# Patient Record
Sex: FEMALE | Race: WHITE | Employment: FULL TIME | URBAN - METROPOLITAN AREA
[De-identification: names, ages, dates, MRNs, and addresses within clinical notes are randomized per-mention and may not be internally consistent; named-entity substitution may affect disease eponyms.]

---

## 2017-03-31 ENCOUNTER — HOSPITAL ENCOUNTER (OUTPATIENT)
Dept: MAMMOGRAPHY | Age: 62
Discharge: HOME | End: 2017-03-31
Attending: INTERNAL MEDICINE | Admitting: INTERNAL MEDICINE

## 2017-03-31 ENCOUNTER — HOSPITAL ENCOUNTER (OUTPATIENT)
Dept: GENERAL RADIOLOGY | Age: 62
Discharge: HOME | End: 2017-03-31
Attending: DERMATOLOGY | Admitting: DERMATOLOGY

## 2017-03-31 DIAGNOSIS — Z12.31 ENCOUNTER FOR SCREENING MAMMOGRAM FOR BREAST CANCER: ICD-10-CM

## 2017-03-31 LAB — POTASSIUM SERPL-SCNC: 4.6 MMOL/L (ref 3.5–5.1)

## 2017-04-07 ENCOUNTER — HOSPITAL ENCOUNTER (OUTPATIENT)
Dept: MAMMOGRAPHY | Age: 62
Discharge: HOME | End: 2017-04-07
Attending: INTERNAL MEDICINE | Admitting: INTERNAL MEDICINE

## 2017-04-07 DIAGNOSIS — R92.8 ABNORMAL MAMMOGRAM: ICD-10-CM

## 2017-04-07 DIAGNOSIS — R92.8 ABNORMAL MAMMOGRAM OF RIGHT BREAST: ICD-10-CM

## 2017-10-05 ENCOUNTER — HOSPITAL ENCOUNTER (OUTPATIENT)
Dept: MAMMOGRAPHY | Age: 62
Discharge: HOME | End: 2017-10-05
Attending: OBSTETRICS & GYNECOLOGY | Admitting: OBSTETRICS & GYNECOLOGY

## 2017-10-05 DIAGNOSIS — R92.8 ABNORMAL MAMMOGRAM, UNSPECIFIED: ICD-10-CM

## 2018-01-29 ENCOUNTER — OFFICE VISIT (OUTPATIENT)
Dept: ORTHOPEDIC SURGERY | Age: 63
End: 2018-01-29

## 2018-01-29 VITALS
WEIGHT: 143.08 LBS | SYSTOLIC BLOOD PRESSURE: 130 MMHG | DIASTOLIC BLOOD PRESSURE: 80 MMHG | BODY MASS INDEX: 26.33 KG/M2 | HEART RATE: 91 BPM | HEIGHT: 62 IN

## 2018-01-29 DIAGNOSIS — M75.82 ROTATOR CUFF TENDINITIS, LEFT: Primary | ICD-10-CM

## 2018-01-29 DIAGNOSIS — M25.512 LEFT SHOULDER PAIN, UNSPECIFIED CHRONICITY: ICD-10-CM

## 2018-01-29 PROCEDURE — 99203 OFFICE O/P NEW LOW 30 MIN: CPT | Performed by: ORTHOPAEDIC SURGERY

## 2018-01-29 RX ORDER — DICLOFENAC SODIUM 75 MG/1
75 TABLET, DELAYED RELEASE ORAL 2 TIMES DAILY
Qty: 60 TABLET | Refills: 1 | Status: SHIPPED | OUTPATIENT
Start: 2018-01-29 | End: 2018-11-06

## 2018-02-07 ENCOUNTER — HOSPITAL ENCOUNTER (OUTPATIENT)
Dept: PHYSICAL THERAPY | Age: 63
Discharge: HOME | End: 2018-02-28
Admitting: ORTHOPAEDIC SURGERY

## 2018-02-07 NOTE — PLAN OF CARE
restricted    ASSESSMENT:   Functional Impairments   []Noted spinal or UE joint hypomobility   []Noted spinal or UE joint hypermobility   [x]Decreased UE functional ROM   [x]Decreased UE functional strength   []Abnormal reflexes/sensation/myotomal/dermatomal deficits   [x]Decreased RC/scapular/core strength and neuromuscular control   []other:      Functional Activity Limitations (from functional questionnaire and intake)   []Reduced ability to tolerate prolonged functional positions   [x]Reduced ability or difficulty with changes of positions or transfers between positions   []Reduced ability to maintain good posture and demonstrate good body mechanics with sitting, bending, and lifting   [] Reduced ability or tolerance with driving and/or computer work   []Reduced ability to sleep   [x]Reduced ability to perform lifting, reaching, carrying tasks   []Reduced ability to tolerate impact through UE   []Reduced ability to reach behind back   [x]Reduced ability to  or hold objects   []Reduced ability to throw or toss an object   []other:    Participation Restrictions   []Reduced participation in self care activities   [x]Reduced participation in home management activities   []Reduced participation in work activities   []Reduced participation in social activities. [x]Reduced participation in sport/recreation activities. Classification:   []Signs/symptoms consistent with post-surgical status including decreased ROM, strength and function.   []Signs/symptoms consistent with joint sprain/strain   [x]Signs/symptoms consistent with shoulder impingement   []Signs/symptoms consistent with shoulder/elbow/wrist tendinopathy   []Signs/symptoms consistent with Rotator cuff tear   []Signs/symptoms consistent with labral tear   []Signs/symptoms consistent with postural dysfunction    []Signs/symptoms consistent with Glenohumeral IR Deficit - <45 degrees   []Signs/symptoms consistent with facet dysfunction of cervical/thoracic indicated  [x] Patient education on joint protection, postural re-education, activity modification, progression of HEP. HEP instruction: (see scanned forms)    GOALS:  Patient stated goal: able to sleep without pain    Therapist goals for Patient:   Short Term Goals: To be achieved in: 2 weeks  1. Independent in HEP and progression per patient tolerance, in order to prevent re-injury. 2. Patient will have a decrease in pain to facilitate improvement in movement, function, and ADLs as indicated by Functional Deficits. Long Term Goals: To be achieved in: 4 weeks  1. Disability index score of 8% or less for the [facility] to assist with reaching prior level of function. 2. Patient will demonstrate increased AROM to WNL L shoulder to allow for proper joint functioning as indicated by patients Functional Deficits. 3. Patient will demonstrate an increase in Strength to grossly 5/5 L shoulder to allow for proper functional mobility as indicated by patients Functional Deficits. 4. Patient will return to caring for her grandson without increased symptoms or restriction.    5. Pt will be able to sleep through the night without waking due to shoulder pain.  (patient specific functional goal)         Electronically signed by:  Monse Meyers PT

## 2018-02-07 NOTE — FLOWSHEET NOTE
re-education                                                 Therapeutic Exercise and NMR EXR  [x] (42360) Provided verbal/tactile cueing for activities related to strengthening, flexibility, endurance, ROM  for improvements in scapular, scapulothoracic and UE control with self care, reaching, carrying, lifting, house/yardwork, driving/computer work. [x] (51945) Provided verbal/tactile cueing for activities related to improving balance, coordination, kinesthetic sense, posture, motor skill, proprioception  to assist with  scapular, scapulothoracic and UE control with self care, reaching, carrying, lifting, house/yardwork, driving/computer work. Therapeutic Activities:    [] (16165 or 05443) Provided verbal/tactile cueing for activities related to improving balance, coordination, kinesthetic sense, posture, motor skill, proprioception and motor activation to allow for proper function of scapular, scapulothoracic and UE control with self care, carrying, lifting, driving/computer work.      Home Exercise Program:    [x] (58934) Reviewed/Progressed HEP activities related to strengthening, flexibility, endurance, ROM of scapular, scapulothoracic and UE control with self care, reaching, carrying, lifting, house/yardwork, driving/computer work  [] (80564) Reviewed/Progressed HEP activities related to improving balance, coordination, kinesthetic sense, posture, motor skill, proprioception of scapular, scapulothoracic and UE control with self care, reaching, carrying, lifting, house/yardwork, driving/computer work      Manual Treatments:  PROM / STM / Oscillations-Mobs:  G-I, II, III, IV (PA's, Inf., Post.)  [] (60142) Provided manual therapy to mobilize soft tissue/joints of cervical/CT, scapular GHJ and UE for the purpose of modulating pain, promoting relaxation,  increasing ROM, reducing/eliminating soft tissue swelling/inflammation/restriction, improving soft tissue extensibility and allowing for proper ROM for

## 2018-02-19 ENCOUNTER — OFFICE VISIT (OUTPATIENT)
Dept: ORTHOPEDIC SURGERY | Age: 63
End: 2018-02-19

## 2018-02-19 VITALS
BODY MASS INDEX: 26.33 KG/M2 | DIASTOLIC BLOOD PRESSURE: 80 MMHG | WEIGHT: 143.08 LBS | SYSTOLIC BLOOD PRESSURE: 134 MMHG | HEIGHT: 62 IN | HEART RATE: 89 BPM

## 2018-02-19 DIAGNOSIS — M25.512 LEFT SHOULDER PAIN, UNSPECIFIED CHRONICITY: ICD-10-CM

## 2018-02-19 DIAGNOSIS — M75.82 ROTATOR CUFF TENDINITIS, LEFT: Primary | ICD-10-CM

## 2018-02-19 PROCEDURE — 99213 OFFICE O/P EST LOW 20 MIN: CPT | Performed by: ORTHOPAEDIC SURGERY

## 2018-02-19 NOTE — PROGRESS NOTES
Prescriptions:     diclofenac (VOLTAREN) 75 MG EC tablet, Take 1 tablet by mouth 2 times daily, Disp: 60 tablet, Rfl: 1    spironolactone (ALDACTONE) 100 MG tablet, Take 100 mg by mouth daily Takes two tablets per day, Disp: , Rfl:     Cyanocobalamin (VITAMIN B-12) 1000 MCG CR tablet, Take 1,000 mcg by mouth daily, Disp: , Rfl:     Cholecalciferol (VITAMIN D3) 2000 UNITS CAPS, Take 1 capsule by mouth daily, Disp: , Rfl:     calcium carbonate (OSCAL) 500 MG TABS tablet, Take 500 mg by mouth daily, Disp: , Rfl:     Lactobacillus (PROBIOTIC ACIDOPHILUS PO), Take 1 capsule by mouth daily, Disp: , Rfl:     spironolactone (ALDACTONE) 100 MG tablet, Take 1.5 tablets by mouth daily. Takes one and a half daily FOR ACNE (Patient taking differently:  Take 200 mg by mouth daily Takes one and a half daily FOR ACNE), Disp: 45 tablet, Rfl: 0    estradiol (ESTRACE) 1 MG tablet, Take 1 mg by mouth daily. , Disp: , Rfl:     Tretinoin, Facial Wrinkles, (TRETINOIN, EMOLLIENT,) 0.05 % CREA, Apply  topically. Face  Indications: for acne, Disp: , Rfl:   Allergies:  Review of patient's allergies indicates no known allergies. Social History:    reports that she quit smoking about 19 years ago. She has a 1.25 pack-year smoking history. She has never used smokeless tobacco. She reports that she drinks about 4.2 oz of alcohol per week . She reports that she does not use drugs. Family History:   Family History   Problem Relation Age of Onset    Heart Disease Mother     Heart Disease Father        REVIEW OF SYSTEMS:   For new problems, a full review of systems will be found scanned in the patient's chart.   CONSTITUTIONAL: Denies unexplained weight loss, fevers, chills   NEUROLOGICAL: Denies unsteady gait or progressive weakness  SKIN: Denies skin changes, delayed healing, rash, itching       PHYSICAL EXAM:    Vitals: Blood pressure 134/80, pulse 89, height 5' 2.01\" (1.575 m), weight 143 lb 1.3 oz (64.9 kg), not currently breastfeeding. GENERAL EXAM:  · General Apparence: Patient is adequately groomed with no evidence of malnutrition. · Orientation: The patient is oriented to time, place and person. · Mood & Affect:The patient's mood and affect are appropriate       LEFT shoulder PHYSICAL EXAMINATION:  · Inspection:  Upon inspection there is no deformity ecchymosis or erythema    · Palpation:  The patient does not have any tenderness at the a.c. Joint arthritic trapezium      · Range of Motion: dear 165° of forward flexion and abduction. Mild tightness at the extremes of internal and external rotation as well    · Strength: she has mild discomfort but no gross deficits with rotator cuff testing today    · Special Tests:  She can feel touch through the LEFT upper extremity            · Skin:  There are no rashes, ulcerations or lesions. · Gait & station: normal gait      · Additional Examinations:        Right Lower Extremity: Examination of the right lower extremity does not show any tenderness, deformity or injury. Range of motion is unremarkable. There is no gross instability. There are no rashes, ulcerations or lesions. Strength and tone are normal.      Diagnostic Testing:          Orders   No orders of the defined types were placed in this encounter. Assessment / Treatment Plan:     1. LEFT shoulder rotator cuff tendinitis. The patient is fairly content with her improvement thus far. At this time we will continue with the physical therapy and anti-inflammatory. Intact but the possibility of a cortisone injection but the patient symptoms are improving. We will plan to see her back in 4-6 weeks p.r.n.    I have personally performed and/or participated in the history, exam and medical decision making and agree with all pertinent clinical information. I have also reviewed and agree with the past medical, family and social history unless otherwise noted.        This dictation was performed with a verbal recognition program [facility]) and it was checked for errors. It is possible that there are still dictated errors within this office note. If so, please bring any errors to my attention for an addendum. All efforts were made to ensure that this office note is accurate.           Roxana Koenig MD

## 2018-03-01 ENCOUNTER — HOSPITAL ENCOUNTER (OUTPATIENT)
Dept: PHYSICAL THERAPY | Age: 63
Discharge: HOME | End: 2018-03-31
Attending: ORTHOPAEDIC SURGERY | Admitting: ORTHOPAEDIC SURGERY

## 2018-05-04 ENCOUNTER — HOSPITAL ENCOUNTER (OUTPATIENT)
Dept: MAMMOGRAPHY | Age: 63
Discharge: HOME | End: 2018-05-04
Attending: INTERNAL MEDICINE | Admitting: INTERNAL MEDICINE

## 2018-05-04 DIAGNOSIS — Z12.31 VISIT FOR SCREENING MAMMOGRAM: ICD-10-CM

## 2018-08-08 ENCOUNTER — HOSPITAL ENCOUNTER (OUTPATIENT)
Age: 63
Discharge: HOME | End: 2018-08-08
Payer: COMMERCIAL

## 2018-08-08 LAB — POTASSIUM SERPL-SCNC: 4.4 MMOL/L (ref 3.5–5.1)

## 2018-08-08 PROCEDURE — 84132 ASSAY OF SERUM POTASSIUM: CPT

## 2018-08-08 PROCEDURE — 36415 COLL VENOUS BLD VENIPUNCTURE: CPT

## 2018-11-06 ENCOUNTER — OFFICE VISIT (OUTPATIENT)
Dept: INTERNAL MEDICINE CLINIC | Age: 63
End: 2018-11-06
Payer: COMMERCIAL

## 2018-11-06 ENCOUNTER — HOSPITAL ENCOUNTER (OUTPATIENT)
Age: 63
Discharge: HOME | End: 2018-11-06
Payer: COMMERCIAL

## 2018-11-06 VITALS
OXYGEN SATURATION: 99 % | SYSTOLIC BLOOD PRESSURE: 130 MMHG | BODY MASS INDEX: 28.16 KG/M2 | WEIGHT: 154 LBS | DIASTOLIC BLOOD PRESSURE: 78 MMHG | HEART RATE: 98 BPM

## 2018-11-06 DIAGNOSIS — Z00.00 WELL ADULT EXAM: Primary | ICD-10-CM

## 2018-11-06 DIAGNOSIS — Z00.00 WELL ADULT EXAM: ICD-10-CM

## 2018-11-06 LAB
A/G RATIO: 1.6 (ref 1.1–2.2)
ALBUMIN SERPL-MCNC: 4.4 G/DL (ref 3.4–5)
ALP BLD-CCNC: 51 U/L (ref 40–129)
ALT SERPL-CCNC: 14 U/L (ref 10–40)
ANION GAP SERPL CALCULATED.3IONS-SCNC: 12 MMOL/L (ref 3–16)
AST SERPL-CCNC: 16 U/L (ref 15–37)
BILIRUB SERPL-MCNC: 0.4 MG/DL (ref 0–1)
BUN BLDV-MCNC: 13 MG/DL (ref 7–20)
CALCIUM SERPL-MCNC: 9.6 MG/DL (ref 8.3–10.6)
CHLORIDE BLD-SCNC: 104 MMOL/L (ref 99–110)
CHOLESTEROL, TOTAL: 218 MG/DL (ref 0–199)
CO2: 26 MMOL/L (ref 21–32)
CREAT SERPL-MCNC: 0.9 MG/DL (ref 0.6–1.2)
GFR AFRICAN AMERICAN: >60
GFR NON-AFRICAN AMERICAN: >60
GLOBULIN: 2.8 G/DL
GLUCOSE BLD-MCNC: 103 MG/DL (ref 70–99)
HDLC SERPL-MCNC: 102 MG/DL (ref 40–60)
LDL CHOLESTEROL CALCULATED: 103 MG/DL
POTASSIUM SERPL-SCNC: 4.5 MMOL/L (ref 3.5–5.1)
SODIUM BLD-SCNC: 142 MMOL/L (ref 136–145)
TOTAL PROTEIN: 7.2 G/DL (ref 6.4–8.2)
TRIGL SERPL-MCNC: 63 MG/DL (ref 0–150)
VLDLC SERPL CALC-MCNC: 13 MG/DL

## 2018-11-06 PROCEDURE — 99386 PREV VISIT NEW AGE 40-64: CPT | Performed by: INTERNAL MEDICINE

## 2018-11-06 PROCEDURE — 80061 LIPID PANEL: CPT

## 2018-11-06 PROCEDURE — 80053 COMPREHEN METABOLIC PANEL: CPT

## 2018-11-06 PROCEDURE — 36415 COLL VENOUS BLD VENIPUNCTURE: CPT

## 2018-11-06 ASSESSMENT — ENCOUNTER SYMPTOMS
COUGH: 0
CONSTIPATION: 0
NAUSEA: 0
ABDOMINAL DISTENTION: 0
SHORTNESS OF BREATH: 0
DIARRHEA: 0
WHEEZING: 0
BACK PAIN: 0
VOMITING: 0
CHEST TIGHTNESS: 0

## 2018-11-06 ASSESSMENT — PATIENT HEALTH QUESTIONNAIRE - PHQ9
SUM OF ALL RESPONSES TO PHQ9 QUESTIONS 1 & 2: 0
SUM OF ALL RESPONSES TO PHQ QUESTIONS 1-9: 0
2. FEELING DOWN, DEPRESSED OR HOPELESS: 0
1. LITTLE INTEREST OR PLEASURE IN DOING THINGS: 0
SUM OF ALL RESPONSES TO PHQ QUESTIONS 1-9: 0

## 2018-11-06 NOTE — PROGRESS NOTES
11/6/18    Navin Arenas Buchheesther  1955      Chief Complaint   Patient presents with    Annual Exam       HPI  Here to establish care and for a well exam  Past Medical History, Medications, Social History, Family History, Health Maintenance have been reviewed with Tony Alexandra. Review of Systems   Constitutional: Negative for unexpected weight change. Respiratory: Negative for cough, chest tightness, shortness of breath and wheezing. Cardiovascular: Negative for chest pain, palpitations and leg swelling. Gastrointestinal: Negative for abdominal distention, constipation, diarrhea, nausea and vomiting. Musculoskeletal: Negative for arthralgias, back pain and myalgias. Neurological: Negative for tremors and numbness. All other systems reviewed and are negative. Current Outpatient Prescriptions   Medication Sig Dispense Refill    spironolactone (ALDACTONE) 100 MG tablet Take 100 mg by mouth daily Takes two tablets per day      Cyanocobalamin (VITAMIN B-12) 1000 MCG CR tablet Take 1,000 mcg by mouth daily      Cholecalciferol (VITAMIN D3) 2000 UNITS CAPS Take 1 capsule by mouth daily      calcium carbonate (OSCAL) 500 MG TABS tablet Take 500 mg by mouth daily      Lactobacillus (PROBIOTIC ACIDOPHILUS PO) Take 1 capsule by mouth daily      spironolactone (ALDACTONE) 100 MG tablet Take 1.5 tablets by mouth daily. Takes one and a half daily FOR ACNE (Patient taking differently:   Take 200 mg by mouth daily Takes one and a half daily FOR ACNE) 45 tablet 0    estradiol (ESTRACE) 1 MG tablet Take 1 mg by mouth daily.  Tretinoin, Facial Wrinkles, (TRETINOIN, EMOLLIENT,) 0.05 % CREA Apply  topically. Face  Indications: for acne       No current facility-administered medications for this visit. Physical Exam   Constitutional: She is oriented to person, place, and time. She appears well-developed and well-nourished. No distress.    HENT:   Right Ear: External ear normal.   Left Ear: External ear normal.   Nose: Nose normal.   Mouth/Throat: Oropharynx is clear and moist. No oropharyngeal exudate. Neck: Neck supple. No thyromegaly present. Cardiovascular: Normal rate, regular rhythm, normal heart sounds and intact distal pulses. Exam reveals no gallop and no friction rub. No murmur heard. Pulmonary/Chest: Effort normal and breath sounds normal. No respiratory distress. She has no wheezes. She has no rales. She exhibits no tenderness. Abdominal: Soft. She exhibits no distension and no mass. There is no tenderness. There is no rebound and no guarding. No hernia. Musculoskeletal: She exhibits no edema. Neurological: She is alert and oriented to person, place, and time. Skin: She is not diaphoretic. Psychiatric: She has a normal mood and affect. Her behavior is normal. Judgment and thought content normal.   Vitals reviewed. Vitals:    11/06/18 0721   BP: 130/78   Pulse: 98   SpO2: 99%         ASSESSMENT/PLAN:  1. Well adult exam  Encouraged exercise and healthy diet. F/u with ob/gyn and dentist regularly. Recommend eye exam.  Wears seat belt. Provided rx for fasting labs. Continue medications. - Lipid Panel; Future  - Comprehensive Metabolic Panel;  Future

## 2019-05-31 ENCOUNTER — OFFICE VISIT (OUTPATIENT)
Dept: INTERNAL MEDICINE CLINIC | Age: 64
End: 2019-05-31
Payer: COMMERCIAL

## 2019-05-31 VITALS
BODY MASS INDEX: 28.89 KG/M2 | HEIGHT: 62 IN | DIASTOLIC BLOOD PRESSURE: 82 MMHG | OXYGEN SATURATION: 98 % | HEART RATE: 96 BPM | WEIGHT: 157 LBS | SYSTOLIC BLOOD PRESSURE: 172 MMHG

## 2019-05-31 DIAGNOSIS — H60.12 CELLULITIS OF LEFT EAR: Primary | ICD-10-CM

## 2019-05-31 PROCEDURE — 99214 OFFICE O/P EST MOD 30 MIN: CPT | Performed by: NURSE PRACTITIONER

## 2019-05-31 RX ORDER — CEPHALEXIN 500 MG/1
500 CAPSULE ORAL 3 TIMES DAILY
Qty: 21 CAPSULE | Refills: 0 | Status: SHIPPED | OUTPATIENT
Start: 2019-05-31 | End: 2019-06-07

## 2019-05-31 ASSESSMENT — ENCOUNTER SYMPTOMS
RHINORRHEA: 0
VOMITING: 0
SINUS PRESSURE: 0
ABDOMINAL DISTENTION: 0
SHORTNESS OF BREATH: 0
NAUSEA: 0
CHEST TIGHTNESS: 0
DIARRHEA: 0
CONSTIPATION: 0
SINUS PAIN: 0

## 2019-05-31 ASSESSMENT — PATIENT HEALTH QUESTIONNAIRE - PHQ9
2. FEELING DOWN, DEPRESSED OR HOPELESS: 0
1. LITTLE INTEREST OR PLEASURE IN DOING THINGS: 0
SUM OF ALL RESPONSES TO PHQ9 QUESTIONS 1 & 2: 0
SUM OF ALL RESPONSES TO PHQ QUESTIONS 1-9: 0
SUM OF ALL RESPONSES TO PHQ QUESTIONS 1-9: 0

## 2019-05-31 NOTE — PROGRESS NOTES
[address]  [address] [facility]  [address]  Tel:[phone]    Mylene Lucero is a 60 y.o. female who presents today for hermedical conditions/complaints as noted below. Mylene Lucero is c/o of Mass (on head)    Chief Complaint   Patient presents with    Mass     on head     HPI:     Patient presents for evaluation of a possible skin infection. Onset of symptoms was abrupt 5 days ago, with gradually improving symptoms since that time. Symptoms include moderate pain and erythema located left ear with swelling/tenderness on the posterior neck/head. There is not a history of trauma to the area. Treatment to date has included benadryl cream with no relief. Subjective:     Review of Systems   Constitutional: Negative for activity change, fatigue and unexpected weight change. HENT: Negative for congestion, postnasal drip, rhinorrhea, sinus pressure and sinus pain. Cellulitis/rash noted to the tragus of the left ear   Respiratory: Negative for chest tightness and shortness of breath. Cardiovascular: Negative for chest pain, palpitations and leg swelling. Gastrointestinal: Negative for abdominal distention, constipation, diarrhea, nausea and vomiting. Genitourinary: Negative for difficulty urinating and urgency. Musculoskeletal: Positive for neck pain and neck stiffness. Skin: Negative for rash. Cellulitis/rash noted to the tragus of the left ear   Neurological: Negative for dizziness, weakness and light-headedness. Objective:     Vitals:    05/31/19 1408   BP: (!) 172/82   Site: Right Upper Arm   Position: Sitting   Cuff Size: Medium Adult   Pulse: 96   SpO2: 98%   Weight: 157 lb (71.2 kg)   Height: 5' 2\" (1.575 m)       Physical Exam   Constitutional: Vital signs are normal. She appears well-developed and well-nourished. HENT:   Head: Normocephalic and atraumatic.    Right Ear: Hearing, tympanic membrane, external ear and ear canal normal.   Left Ear: Hearing, tympanic membrane and ear canal normal.   Ears:    Nose: Nose normal.   Eyes: Pupils are equal, round, and reactive to light. Lids are normal.   Neck: Normal range of motion. Muscular tenderness present. Cardiovascular: Normal rate, regular rhythm, S1 normal, S2 normal, normal heart sounds and normal pulses. No extrasystoles are present. PMI is not displaced. Exam reveals no gallop, no S3, no S4, no distant heart sounds and no friction rub. No murmur heard. No systolic murmur is present. No diastolic murmur is present. Pulmonary/Chest: Effort normal and breath sounds normal. No accessory muscle usage. No respiratory distress. She has no decreased breath sounds. She has no wheezes. She has no rhonchi. She has no rales. Abdominal: Soft. Normal appearance and bowel sounds are normal.   Lymphadenopathy:        Head (right side): No submandibular, no tonsillar, no preauricular and no posterior auricular adenopathy present. Head (left side): Posterior auricular adenopathy present. No submandibular, no tonsillar and no preauricular adenopathy present. She has cervical adenopathy. Left cervical: Posterior cervical adenopathy present. Neurological: She is alert. Skin: Skin is warm, dry and intact. Psychiatric: She has a normal mood and affect. Her speech is normal.   Nursing note and vitals reviewed. Assessment & Plan: The following diagnoses and conditions are stable with no further orders unlessindicated:    1. Cellulitis of left ear      Reji Pichardo was seen today for mass. Diagnoses and all orders for this visit:    Cellulitis of left ear  -     cephALEXin (KEFLEX) 500 MG capsule; Take 1 capsule by mouth 3 times daily for 7 days    Begin antibiotic for cellulitis of left ear with resultant reactive lymphadenopathy to the posterior cervical chain and anterior nodes.  Can trial ibuprofen for relief of inflammation for the neck

## 2019-06-14 ENCOUNTER — HOSPITAL ENCOUNTER (OUTPATIENT)
Dept: WOMENS IMAGING | Age: 64
Discharge: HOME | End: 2019-06-14
Payer: COMMERCIAL

## 2019-06-14 DIAGNOSIS — Z12.31 VISIT FOR SCREENING MAMMOGRAM: ICD-10-CM

## 2019-06-14 PROCEDURE — 77067 SCR MAMMO BI INCL CAD: CPT

## 2019-06-19 ENCOUNTER — TELEPHONE (OUTPATIENT)
Dept: WOMENS IMAGING | Age: 64
End: 2019-06-19

## 2019-07-03 ENCOUNTER — HOSPITAL ENCOUNTER (OUTPATIENT)
Dept: WOMENS IMAGING | Age: 64
Discharge: HOME | End: 2019-07-03
Payer: COMMERCIAL

## 2019-07-03 DIAGNOSIS — R92.8 ABNORMAL MAMMOGRAM: ICD-10-CM

## 2019-07-03 PROCEDURE — 77065 DX MAMMO INCL CAD UNI: CPT

## 2019-08-27 ENCOUNTER — TELEPHONE (OUTPATIENT)
Dept: INTERNAL MEDICINE CLINIC | Age: 64
End: 2019-08-27

## 2019-08-28 ENCOUNTER — TELEPHONE (OUTPATIENT)
Dept: INTERNAL MEDICINE CLINIC | Age: 64
End: 2019-08-28

## 2019-08-28 ENCOUNTER — HOSPITAL ENCOUNTER (OUTPATIENT)
Age: 64
Discharge: HOME | End: 2019-08-28
Payer: COMMERCIAL

## 2019-08-28 ENCOUNTER — NURSE ONLY (OUTPATIENT)
Dept: INTERNAL MEDICINE CLINIC | Age: 64
End: 2019-08-28
Payer: COMMERCIAL

## 2019-08-28 DIAGNOSIS — Z00.00 WELL ADULT EXAM: Primary | ICD-10-CM

## 2019-08-28 DIAGNOSIS — Z00.00 WELL ADULT EXAM: ICD-10-CM

## 2019-08-28 DIAGNOSIS — Z23 NEED FOR SHINGLES VACCINE: Primary | ICD-10-CM

## 2019-08-28 LAB — POTASSIUM SERPL-SCNC: 4.3 MMOL/L (ref 3.5–5.1)

## 2019-08-28 PROCEDURE — 84132 ASSAY OF SERUM POTASSIUM: CPT

## 2019-08-28 PROCEDURE — 36415 COLL VENOUS BLD VENIPUNCTURE: CPT

## 2019-08-28 PROCEDURE — 90471 IMMUNIZATION ADMIN: CPT | Performed by: INTERNAL MEDICINE

## 2019-08-28 PROCEDURE — 90750 HZV VACC RECOMBINANT IM: CPT | Performed by: INTERNAL MEDICINE

## 2019-10-28 ENCOUNTER — TELEPHONE (OUTPATIENT)
Dept: INTERNAL MEDICINE CLINIC | Age: 64
End: 2019-10-28

## 2019-10-28 DIAGNOSIS — Z00.00 ROUTINE GENERAL MEDICAL EXAMINATION AT A HEALTH CARE FACILITY: Primary | ICD-10-CM

## 2019-11-01 ENCOUNTER — NURSE ONLY (OUTPATIENT)
Dept: INTERNAL MEDICINE CLINIC | Age: 64
End: 2019-11-01
Payer: COMMERCIAL

## 2019-11-01 DIAGNOSIS — Z23 NEED FOR SHINGLES VACCINE: ICD-10-CM

## 2019-11-01 DIAGNOSIS — Z23 NEED FOR IMMUNIZATION AGAINST INFLUENZA: Primary | ICD-10-CM

## 2019-11-01 PROCEDURE — 90471 IMMUNIZATION ADMIN: CPT | Performed by: NURSE PRACTITIONER

## 2019-11-01 PROCEDURE — 90750 HZV VACC RECOMBINANT IM: CPT | Performed by: NURSE PRACTITIONER

## 2019-11-02 ENCOUNTER — HOSPITAL ENCOUNTER (OUTPATIENT)
Age: 64
Discharge: HOME | End: 2019-11-02
Payer: COMMERCIAL

## 2019-11-02 DIAGNOSIS — Z00.00 ROUTINE GENERAL MEDICAL EXAMINATION AT A HEALTH CARE FACILITY: ICD-10-CM

## 2019-11-02 LAB
A/G RATIO: 2 (ref 1.1–2.2)
ALBUMIN SERPL-MCNC: 5.3 G/DL (ref 3.4–5)
ALP BLD-CCNC: 68 U/L (ref 40–129)
ALT SERPL-CCNC: 16 U/L (ref 10–40)
ANION GAP SERPL CALCULATED.3IONS-SCNC: 17 MMOL/L (ref 3–16)
AST SERPL-CCNC: 16 U/L (ref 15–37)
BILIRUB SERPL-MCNC: 1 MG/DL (ref 0–1)
BUN BLDV-MCNC: 11 MG/DL (ref 7–20)
CALCIUM SERPL-MCNC: 9.5 MG/DL (ref 8.3–10.6)
CHLORIDE BLD-SCNC: 99 MMOL/L (ref 99–110)
CHOLESTEROL, TOTAL: 233 MG/DL (ref 0–199)
CO2: 24 MMOL/L (ref 21–32)
CREAT SERPL-MCNC: 0.9 MG/DL (ref 0.6–1.2)
GFR AFRICAN AMERICAN: >60
GFR NON-AFRICAN AMERICAN: >60
GLOBULIN: 2.6 G/DL
GLUCOSE BLD-MCNC: 103 MG/DL (ref 70–99)
HDLC SERPL-MCNC: 103 MG/DL (ref 40–60)
LDL CHOLESTEROL CALCULATED: 102 MG/DL
POTASSIUM SERPL-SCNC: 4.1 MMOL/L (ref 3.5–5.1)
SODIUM BLD-SCNC: 140 MMOL/L (ref 136–145)
TOTAL PROTEIN: 7.9 G/DL (ref 6.4–8.2)
TRIGL SERPL-MCNC: 142 MG/DL (ref 0–150)
VLDLC SERPL CALC-MCNC: 28 MG/DL

## 2019-11-02 PROCEDURE — 80061 LIPID PANEL: CPT

## 2019-11-02 PROCEDURE — 80053 COMPREHEN METABOLIC PANEL: CPT

## 2019-11-02 PROCEDURE — 36415 COLL VENOUS BLD VENIPUNCTURE: CPT

## 2019-11-11 ENCOUNTER — OFFICE VISIT (OUTPATIENT)
Dept: INTERNAL MEDICINE CLINIC | Age: 64
End: 2019-11-11
Payer: COMMERCIAL

## 2019-11-11 VITALS
DIASTOLIC BLOOD PRESSURE: 88 MMHG | BODY MASS INDEX: 28.53 KG/M2 | WEIGHT: 156 LBS | SYSTOLIC BLOOD PRESSURE: 138 MMHG | HEART RATE: 98 BPM | OXYGEN SATURATION: 98 %

## 2019-11-11 DIAGNOSIS — Z00.00 WELL ADULT EXAM: Primary | ICD-10-CM

## 2019-11-11 PROCEDURE — 99396 PREV VISIT EST AGE 40-64: CPT | Performed by: INTERNAL MEDICINE

## 2020-03-30 NOTE — PROGRESS NOTES
PHYSICAL EXAMINATION:  · Inspection:  No visible asymmetry or deformity. · Palpation:  Tenderness in the subacromial region to palpation. No  · Asthma no neck tenderness. No pain past the deltoid. · Range of Motion: Slightly limited particularly in abduction external rotation and abduction internal rotation. · Strength: No focal motor weakness. · Special Tests:  Positive supraspinous sign LEFT. Minimally positive speed's test.  Negative crossarm test.  Negative apprehension sign. · Skin:  There are no rashes, ulcerations or lesions. · Gait & station:       · Additional Examinations:        Right Upper Extremity:  Examination of the right upper extremity does not show any tenderness, deformity or injury. Range of motion is unremarkable. There is no gross instability. There are no rashes, ulcerations or lesions. Strength and tone are normal.      Diagnostic Testing:      3 x-ray views of the LEFT shoulder demonstrates    Orders     Orders Placed This Encounter   Procedures    XR SHOULDER LEFT (MIN 2 VIEWS)     [id number]     Order Specific Question:   Reason for exam:     Answer:   Pain         Assessment / Treatment Plan:     1. LEFT rotator cuff tendinitis. We discussed treatment options. She is going to try a course of Voltaren 75 mg b.i.d. and physical therapy. Follow-up will be in 3-4 weeks p.r.n.    2. I have personally performed and/or participated in the history, exam and medical decision making and agree with all pertinent clinical information. I have also reviewed and agree with the past medical, family and social history unless otherwise noted. This dictation was performed with a verbal recognition program ([employer]) and it was checked for errors. It is possible that there are still dictated errors within this office note. If so, please bring any errors to my attention for an addendum. All efforts were made to ensure that this office note is accurate.           Bruce Lowry Facundo Olivier MD

## 2020-08-10 ENCOUNTER — OFFICE VISIT (OUTPATIENT)
Dept: PRIMARY CARE CLINIC | Age: 65
End: 2020-08-10
Payer: COMMERCIAL

## 2020-08-10 PROCEDURE — 99211 OFF/OP EST MAY X REQ PHY/QHP: CPT | Performed by: NURSE PRACTITIONER

## 2020-08-10 NOTE — PROGRESS NOTES
Stacy Everett received a viral test for COVID-19. They were educated on isolation and quarantine as appropriate. For any symptoms, they were directed to seek care from their PCP, given contact information to establish with a doctor, directed to an urgent care or the emergency room.

## 2020-08-10 NOTE — PATIENT INSTRUCTIONS

## 2020-08-14 LAB
SARS-COV-2: NOT DETECTED
SOURCE: NORMAL

## 2020-08-17 ENCOUNTER — OFFICE VISIT (OUTPATIENT)
Dept: PRIMARY CARE CLINIC | Age: 65
End: 2020-08-17
Payer: COMMERCIAL

## 2020-08-17 PROCEDURE — 99211 OFF/OP EST MAY X REQ PHY/QHP: CPT | Performed by: NURSE PRACTITIONER

## 2020-08-17 NOTE — PATIENT INSTRUCTIONS
Advance Care Planning  People with COVID-19 may have no symptoms, mild symptoms, such as fever, cough, and shortness of breath or they may have more severe illness, developing severe and fatal pneumonia. As a result, Advance Care Planning with attention to naming a health care decision maker (someone you trust to make healthcare decisions for you if you could not speak for yourself) and sharing other health care preferences is important BEFORE a possible health crisis. Please contact your Primary Care Provider to discuss Advance Care Planning. Preventing the Spread of Coronavirus Disease 2019 in Homes and Residential Communities  For the most recent information go to [email or web address]    Prevention steps for People with confirmed or suspected COVID-19 (including persons under investigation) who do not need to be hospitalized  and   People with confirmed COVID-19 who were hospitalized and determined to be medically stable to go home    Your healthcare provider and public health staff will evaluate whether you can be cared for at home. If it is determined that you do not need to be hospitalized and can be isolated at home, you will be monitored by staff from your local or state health department. You should follow the prevention steps below until a healthcare provider or local or state health department says you can return to your normal activities. Stay home except to get medical care  People who are mildly ill with COVID-19 are able to isolate at home during their illness. You should restrict activities outside your home, except for getting medical care. Do not go to work, school, or public areas. Avoid using public transportation, ride-sharing, or taxis. Separate yourself from other people and animals in your home  People: As much as possible, you should stay in a specific room and away from other people in your home.  Also, you should use a separate before eating or preparing food. If soap and water are not readily available, use an alcohol-based hand  with at least 60% alcohol, covering all surfaces of your hands and rubbing them together until they feel dry. Soap and water are the best option if hands are visibly dirty. Avoid touching your eyes, nose, and mouth with unwashed hands. Avoid sharing personal household items  You should not share dishes, drinking glasses, cups, eating utensils, towels, or bedding with other people or pets in your home. After using these items, they should be washed thoroughly with soap and water. Clean all high-touch surfaces everyday  High touch surfaces include counters, tabletops, doorknobs, bathroom fixtures, toilets, phones, keyboards, tablets, and bedside tables. Also, clean any surfaces that may have blood, stool, or body fluids on them. Use a household cleaning spray or wipe, according to the label instructions. Labels contain instructions for safe and effective use of the cleaning product including precautions you should take when applying the product, such as wearing gloves and making sure you have good ventilation during use of the product. Monitor your symptoms  Seek prompt medical attention if your illness is worsening (e.g., difficulty breathing). Before seeking care, call your healthcare provider and tell them that you have, or are being evaluated for, COVID-19. Put on a facemask before you enter the facility. These steps will help the healthcare providers office to keep other people in the office or waiting room from getting infected or exposed. Ask your healthcare provider to call the local or state health department. Persons who are placed under active monitoring or facilitated self-monitoring should follow instructions provided by their local health department or occupational health professionals, as appropriate. When working with your local health department check their available hours.   If you

## 2020-08-18 LAB — SARS-COV-2, NAA: NOT DETECTED

## 2020-09-15 ENCOUNTER — OFFICE VISIT (OUTPATIENT)
Dept: INTERNAL MEDICINE CLINIC | Age: 65
End: 2020-09-15
Payer: COMMERCIAL

## 2020-09-15 ENCOUNTER — HOSPITAL ENCOUNTER (OUTPATIENT)
Dept: GENERAL RADIOLOGY | Age: 65
Discharge: HOME | End: 2020-09-15
Payer: COMMERCIAL

## 2020-09-15 ENCOUNTER — HOSPITAL ENCOUNTER (OUTPATIENT)
Age: 65
Discharge: HOME | End: 2020-09-15
Payer: COMMERCIAL

## 2020-09-15 VITALS
BODY MASS INDEX: 28.72 KG/M2 | WEIGHT: 157 LBS | TEMPERATURE: 97.7 F | SYSTOLIC BLOOD PRESSURE: 135 MMHG | DIASTOLIC BLOOD PRESSURE: 75 MMHG | HEART RATE: 121 BPM | OXYGEN SATURATION: 97 %

## 2020-09-15 PROCEDURE — 73030 X-RAY EXAM OF SHOULDER: CPT

## 2020-09-15 PROCEDURE — 99214 OFFICE O/P EST MOD 30 MIN: CPT | Performed by: INTERNAL MEDICINE

## 2020-09-15 RX ORDER — VALACYCLOVIR HYDROCHLORIDE 1 G/1
2000 TABLET, FILM COATED ORAL 2 TIMES DAILY
Qty: 8 TABLET | Refills: 0 | Status: SHIPPED | OUTPATIENT
Start: 2020-09-15 | End: 2021-11-11 | Stop reason: SDUPTHER

## 2020-09-15 ASSESSMENT — ENCOUNTER SYMPTOMS
NAUSEA: 0
COUGH: 0
SHORTNESS OF BREATH: 0
ABDOMINAL DISTENTION: 0
WHEEZING: 0
ROS SKIN COMMENTS: COLD SORES
CONSTIPATION: 0
VOMITING: 0
BACK PAIN: 0
DIARRHEA: 0
CHEST TIGHTNESS: 0

## 2020-09-15 ASSESSMENT — PATIENT HEALTH QUESTIONNAIRE - PHQ9
SUM OF ALL RESPONSES TO PHQ QUESTIONS 1-9: 0
SUM OF ALL RESPONSES TO PHQ QUESTIONS 1-9: 0
SUM OF ALL RESPONSES TO PHQ9 QUESTIONS 1 & 2: 0
2. FEELING DOWN, DEPRESSED OR HOPELESS: 0
1. LITTLE INTEREST OR PLEASURE IN DOING THINGS: 0

## 2020-09-15 NOTE — PROGRESS NOTES
9/15/20    [facility]  1955      Chief Complaint   Patient presents with    Shoulder Pain       HPI    Here with c/o 3 day h/o left shoulder pain. Tripped over her grandchild landing on her left shoulder. Has had increased pain and restricted rom due to discomfort. Pain is constant and does not radiate. No bruising, numbness, weakness. Gets some relief with ibuprofen 400mg and Aspercreme. Pt also c/o intermittent episodes of cold sores in her nose and lips. Does not take anything for her sxs. Sxs present for several years. Occurs one to two times a year    Review of Systems   Constitutional: Negative for unexpected weight change. Respiratory: Negative for cough, chest tightness, shortness of breath and wheezing. Cardiovascular: Negative for chest pain, palpitations and leg swelling. Gastrointestinal: Negative for abdominal distention, constipation, diarrhea, nausea and vomiting. Musculoskeletal: Positive for arthralgias. Negative for back pain and myalgias. Skin:        Cold sores   Neurological: Negative for tremors and numbness. All other systems reviewed and are negative. Current Outpatient Medications   Medication Sig Dispense Refill    valACYclovir (VALTREX) 1 g tablet Take 2 tablets by mouth 2 times daily for 1 day 8 tablet 0    Cyanocobalamin (VITAMIN B-12) 1000 MCG CR tablet Take 1,000 mcg by mouth daily      Cholecalciferol (VITAMIN D3) 2000 UNITS CAPS Take 1 capsule by mouth daily      calcium carbonate (OSCAL) 500 MG TABS tablet Take 500 mg by mouth daily      Lactobacillus (PROBIOTIC ACIDOPHILUS PO) Take 1 capsule by mouth daily      spironolactone (ALDACTONE) 100 MG tablet Take 1.5 tablets by mouth daily. Takes one and a half daily FOR ACNE (Patient taking differently:   Take 200 mg by mouth daily Takes one and a half daily FOR ACNE) 45 tablet 0    Tretinoin, Facial Wrinkles, (TRETINOIN, EMOLLIENT,) 0.05 % CREA Apply  topically.  Face  Indications: for acne No current facility-administered medications for this visit. Physical Exam  Constitutional:       General: She is not in acute distress. Appearance: She is well-developed. She is not diaphoretic. HENT:      Right Ear: External ear normal.      Left Ear: External ear normal.      Mouth/Throat:      Pharynx: No oropharyngeal exudate. Neck:      Musculoskeletal: Neck supple. Thyroid: No thyromegaly. Cardiovascular:      Rate and Rhythm: Normal rate and regular rhythm. Heart sounds: Normal heart sounds. No murmur. No friction rub. No gallop. Pulmonary:      Effort: Pulmonary effort is normal. No respiratory distress. Breath sounds: Normal breath sounds. No wheezing or rales. Abdominal:      General: There is no distension. Palpations: Abdomen is soft. Tenderness: There is no abdominal tenderness. There is no guarding or rebound. Musculoskeletal:      Comments: Left shoulder with no deformity, palpable tenderness. Active and and passive rom decreased with shoulder abduction, flex/ext due to discomfort. Left UE neurovascularly intact   Skin:     General: Skin is warm and dry. Findings: No rash. Neurological:      Mental Status: She is alert and oriented to person, place, and time. Vitals:    09/15/20 1449   BP: 135/75   Pulse:    Temp:    SpO2:          ASSESSMENT/PLAN:  1. Acute pain of left shoulder  Recommend ibuprofen 600mg tid with food for 7 days. Consider PT if sxs persist  - XR SHOULDER LEFT (MIN 2 VIEWS); Future    2. H/O cold sores  tx optiions discussed with pt. Discussed use, benefit, and side effects of prescribed medications. Barriers to compliance discussed. All patient questions answered. Pt voiced understanding. Call if no improvement or worsening symptoms  - valACYclovir (VALTREX) 1 g tablet; Take 2 tablets by mouth 2 times daily for 1 day  Dispense: 8 tablet;  Refill: 0

## 2020-09-16 ENCOUNTER — CLINICAL DOCUMENTATION (OUTPATIENT)
Dept: INTERNAL MEDICINE CLINIC | Age: 65
End: 2020-09-16

## 2020-10-05 ENCOUNTER — TELEPHONE (OUTPATIENT)
Dept: INTERNAL MEDICINE CLINIC | Age: 65
End: 2020-10-05

## 2020-10-05 NOTE — TELEPHONE ENCOUNTER
Spoke with patient she would like to start some PT and also get the MRI done. She has used [facility]ena in the past but it was pricey. She would like to be referred to [facility] PT if possible. Please order MRI and PT and I will call patient with contact information.

## 2020-10-05 NOTE — TELEPHONE ENCOUNTER
Dr Josh Martin recommended PT in her OV note. With reviewing OV note from Dr Roxann Somers, I see there was an injury. If no improvement, recommend MRI and PT. Then if no resolution, we can refer to Ortho. Let me know if patient would like to follow through on this.

## 2020-10-05 NOTE — TELEPHONE ENCOUNTER
----- Message from Kristie Guillaume sent at 10/2/2020  3:07 PM EDT -----  Subject: Message to Provider    QUESTIONS  Information for Provider? Pt wants to know what she needs to do to start   physical therapy she's been having some shoulder pain. Wondering if she   needs to see and orthopedic Dr first or if you can refer her.  ---------------------------------------------------------------------------  --------------  [address]  What is the best way for the office to contact you? OK to leave message on   voicemail  Preferred Call Back Phone Number? [phone]  ---------------------------------------------------------------------------  --------------  SCRIPT ANSWERS  Relationship to Patient?  Self

## 2020-10-12 ENCOUNTER — APPOINTMENT (OUTPATIENT)
Dept: PHYSICAL THERAPY | Age: 65
End: 2020-10-12
Payer: COMMERCIAL

## 2020-10-14 ENCOUNTER — TELEPHONE (OUTPATIENT)
Dept: INTERNAL MEDICINE CLINIC | Age: 65
End: 2020-10-14

## 2020-10-14 ENCOUNTER — HOSPITAL ENCOUNTER (OUTPATIENT)
Dept: PHYSICAL THERAPY | Age: 65
Setting detail: THERAPIES SERIES
Discharge: HOME | End: 2020-10-14
Payer: COMMERCIAL

## 2020-10-14 PROCEDURE — 97530 THERAPEUTIC ACTIVITIES: CPT

## 2020-10-14 PROCEDURE — 97161 PT EVAL LOW COMPLEX 20 MIN: CPT

## 2020-10-14 NOTE — PROGRESS NOTES
Physical Therapy  Initial Assessment  Date: 10/14/2020  Patient Name: Davon Cordova  MRN: [id number]  : 1955     Treatment Diagnosis: Arm Pain    Subjective   General  Chart Reviewed: Yes  Patient assessed for rehabilitation services?: Yes  Family / Caregiver Present: No  Referring Practitioner: WALLY Santiago CNP  Diagnosis: M25.512 (ICD-10-CM) - Acute pain of left shoulder, W19. Rodger Lozano (ICD-10-CM) - Fall, subsequent encounter  PT Visit Information  Onset Date: 10/05/20  PT Insurance Information: [facility]  Total # of Visits Approved: 12  Subjective  Subjective: Pt stated had some therapy for her L shoudler back in 2018. Pt is coming for the same issue from a different injury. Pt said that she tripped over her one year old and fell backwars. Pt fell on her elblow and hurt her shoulder. Pt had pain immdiatly after the fall. Pt said this happened on a Saturday and kay to the MD on Tuesday. Pt had an x-ray. Pt was told she had no broken bones. MD told her to give her 2 weeks and if she had pain/limited ROM to try some PT. Pt fall was on . Pt said her symptoms have been improving. Pt said sitting does not hurt her at all. Standing does not bother it. Pt says she has pain any time she is laying. Pt has pain in the top of her shoulder. upper arm, and down in to the lower arm. Pt has no numbness and tingling in the L arm. Pt said she has some neck issues that she has been dealing with before the fall. Has not noticed anything new as far as her neck. Pt has difficulty reaching up, pushing, and reaching back. No nausea, vision changes, or dizziness. Pt works as a . Pt can perform all of her job related tasks without pain besides for lifting. Pt has pain getting dressed and showering due to pain in her L arm. Pt said she enjoys playing golf, walking, softball. Pt said her pain gets up to a 4-5 at night when laying. Pt said taking advil an icing the shoulder helps with the pain.  Pt said she is a side sleeper. Pt said she wakes up multiple times a night due to her shoulder. Pt is R handed. Pt also has difficulty lifting grandbabies (5 y/o, 2 y/o, 3 months, and 2 weeks       Orientation  Orientation  Overall Orientation Status: Within Normal Limits    Objective     Observation/Palpation  Posture: Fair  Palpation: Pt had mild TTP over biceps in bicipital groove, insertion of rotator cuff and over UT. R shoulder depressed compared to L. mild scapular winging, upward rotation, and anterior tilt  Observation: Bilateral AGMR of UE.  Rounded shoulders, FHP  Body Mechanics: Bilateral AGMR of UE. shoulder elevation when performing flexion and abduction of LUE. rounded shoulders, FHP    PROM RLE (degrees)  RLE PROM: WNL  AROM RLE (degrees)  RLE AROM: WNL  PROM LLE (degrees)  LLE General PROM: Flexion: 120 degrees, Abduction 48 degrees, IR: 60 degrees, ER: 60 degrees  Spine  Cervical: Flexion 70 degrees, Extension 50 degrees, L side bendin degrees, R side bendin degrees, L rotation: 65 degrees, R rotation: 40 degrees  Special Tests: Sharp pursors (-), compression (-), distraction (-), alar lig test (-)  Joint Mobility  Spine: Cervical hypomobility C0-C1, C1-C2, and C3-C5 L>R    Strength RUE  Strength RUE: WFL  Strength LUE  Strength LUE: Exception  L Shoulder Flexion: 3+/5  L Shoulder ABduction: 3/5  L Shoulder Internal Rotation: 3+/5  L Shoulder External Rotation: 3+/5  L Elbow Flexion: 4+/5  L Elbow Extension: 4+/5  L Wrist Flexion: 4+/5  L Wrist Extension: 4+/5  Strength Other  Other: Serratus (3+)     Additional Measures  Special Tests: Drop arm (-), empty can (+), Speeds Test (-), yergason's test (-), Gutiérrez-johnathan (+), Chandelier Test (+)  Other: DTR: 2+ Normal Bilaterally, Clonus (-), Hoffmans (-)  Sensation  Overall Sensation Status: WNL    Assessment   Conditions Requiring Skilled Therapeutic Intervention  Body structures, Functions, Activity limitations: Increased pain;Decreased posture;Decreased balance;Decreased ADL status; Decreased ROM; Decreased strength;Decreased high-level IADLs  Assessment: Pt is a 64 y/o female that presents to [facility] after pt tripped over her grandchild and fell on her L shoudler resulting in increased pain. Pt presents with multiple decefits affecting pt`s quality of life including decreased cervical ROM, decreased L shoulder ROM, impaired LUE strength, cervical and LGHJ hypomobility, and impaired posture resulting in AGMR of BUE. Pt had postiive tests for hernandez-johnathan and empty can test indicating rotator cuff involvment and impingement. Pt would benefit from skilled physical therapy in order to improve impairments and progress toward goals to maximize function  Treatment Diagnosis: Arm Pain  Prognosis: Good  Decision Making: Low Complexity  REQUIRES PT FOLLOW UP: Yes         Plan   Plan  Times per week: 2x/week for 6 weeks  Plan weeks: 6 weeks  Current Treatment Recommendations: Strengthening, Neuromuscular Re-education, Home Exercise Program, ROM, Manual Therapy - Soft Tissue Mobilization, Balance Training, Endurance Training, Manual Therapy - Joint Manipulation, Modalities, Pain Management, Stair training    G-Code   Quick Dash Sum Score: 33/55   Quick Dash Calculated Score: 50%    Goals  Long term goals  Time Frame for Long term goals : 6 weeks  Long term goal 1: Pt will be independent and compliant with HEP  Long term goal 2: Pt will improve LUE strength to 4/5 gross strength  Long term goal 3: Pt will improve LUE ROM to WNL  Long term goal 4: Pt will report at least 75% improvement in frequency and duration of pain  Long term goal 5: Pt will report being able to sleep through the night without waking up secondary to L shoulder pain. Long term goal 5: Pt will improve QuickDash Score by at least 10 points to demonstrate improvement in function.      Therapy Time   Individual Concurrent Group Co-treatment   Time In 8128 Time Out 0815         Minutes 42         Timed Code Treatment Minutes: 69 Tyner Momo Ambrosio PT      Outpatient Physical Therapy  Phone: [phone]            Fax: [phone]          To: WALLY Santiago CNP                                  From: Laura Tomlin PT    Patient: Davon Cordova                                         : 1955  Diagnosis: M25.512 (ICD-10-CM) - Acute pain of left shoulder, W19. XXXD (ICD-10-CM) - Fall, subsequent encounter                                       Date: 10/14/2020  Treatment Diagnosis:  Shoulder      Physical Therapy Certification/Re-Certification Form  Dear WALLY Pizarro CNP  The following patient has been evaluated for physical therapy services and for therapy to continue, Medicare requires monthly physician review of the treatment plan. Please review the attached evaluation and/or summary of the patient's plan of care, and verify that you agree therapy should continue by signing the attached document and sending it back to our office.     Plan of Care/Treatment to date:  [x]? Therapeutic Exercise                     [x]? Modalities:  [x]? Therapeutic Activity                                   []? Ultrasound              []? Electrical Stimulation       []? Gait Training                                              []? Cervical Traction    []? Lumbar Traction  [x]? Neuromuscular Re-education                    []? Hot/Coldpack         []? Iontophoresis                       [x]? Instruction in HEP                                      Other:  [x]? Manual Therapy                                                     []?                        []? Aquatic Therapy                                                     []?                      ?                         Frequency/Duration:  # Days per week:       []? 1 day          # Weeks:        []? 1 week        []? 5 weeks                                            [x]? 2 days? []? 2 weeks      [x]? 6 weeks                                      []? 3 days                                 []? 3 weeks      []? 7 weeks                                      []? 4 days                                 []? 4 weeks      []? 8 weeks     Rehab Potential:        []? Excellent     [x]? Good          []? Fair             []? Poor                    Electronically signed by:  Sherry Dan PT        If you have any questions or concerns, please don't hesitate to call.   Thank you for your referral.     Physician Signature:________________________________Date:__________________  By signing above, therapists plan is approved by physician

## 2020-10-14 NOTE — TELEPHONE ENCOUNTER
----- Message from Ninfa Freitas sent at 10/14/2020  2:03 PM EDT -----  Subject: Message to Provider    QUESTIONS  Information for Provider? patient would like the potassium results sent   over to her Dermatologist Fabian Culp [phone]  ---------------------------------------------------------------------------  --------------  [address]  What is the best way for the office to contact you? OK to leave message on   voicemail  Preferred Call Back Phone Number? [phone]  ---------------------------------------------------------------------------  --------------  SCRIPT ANSWERS  Relationship to Patient?  Self

## 2020-10-14 NOTE — FLOWSHEET NOTE
Physical Therapy Daily Treatment Note    Date:  10/14/2020    Patient Name:  Jeffrey Basilio    :  1955  MRN: [id number]  Restrictions/Precautions:    Medical/Treatment Diagnosis Information:  · Diagnosis: M25.512 (ICD-10-CM) - Acute pain of left shoulder, W19. Tamera Burleson (ICD-10-CM) - Fall, subsequent encounter  Insurance/Certification information:  PT Insurance Information: [facility]  Physician Information:  Referring Practitioner: WALLY Klein CNP  Plan of care signed (Y/N):  N  Visit# / total visits:       G-Code (if applicable): Quick Dash Calculated Score 33/55    Quick Dash Sum Score: 50%      Time in:   7:33     Timed Treatment: 42 Total Treatment Time:  15    ________________________________________________________________________________________    Pain Level:    /10  SUBJECTIVE:  See Initial Evaluation     OBJECTIVE:     Exercise/Equipment Resistance/Repetitions Other comments     Cane press nv      sidelying ER   Sidelying abduction nv  nv      No $ nv      Scapular retraction  nv                                                                                                                  Other Therapeutic Activities:  Pt was educated on diagnosis, involved anatomy, POC, HEP, established goals, and facility policies.      Manual Treatments:       GHJ mobs grade 2-3 nv  Cervical mobs nv  First rib nv  Gentle PROM    Modalities:      Test/Measurements:  See Initial Evaluation        ASSESSMENT:  See Initial Evaluation     Treatment/Activity Tolerance:   [x]Patient tolerated treatment well [] Patient limited by fatique  [x]Patient limited by pain [] Patient limited by other medical complications  [] Other:     Goals:          Long term goals  Time Frame for Long term goals : 6 weeks  Long term goal 1: Pt will be independent and compliant with HEP  Long term goal 2: Pt will improve LUE strength to 4/5 gross strength  Long term goal 3: Pt will improve LUE ROM to WNL  Long term goal 4: Pt will report at least 75% improvement in frequency and duration of pain  Long term goal 5: Pt will report being able to sleep through the night without waking up secondary to L shoulder pain. Long term goal 5: Pt will improve QuickDash Score by at least 10 points to demonstrate improvement in function. Plan: [] Continue per plan of care [] Alter current plan (see comments)   [x] Plan of care initiated [] Hold pending MD visit [] Discharge      Plan for Next Session:      Re-Certification Due Date:         Signature:   Maria Eugenia Cline PT

## 2020-10-21 ENCOUNTER — HOSPITAL ENCOUNTER (OUTPATIENT)
Dept: PHYSICAL THERAPY | Age: 65
Setting detail: THERAPIES SERIES
Discharge: HOME | End: 2020-10-21
Payer: COMMERCIAL

## 2020-10-21 PROCEDURE — 97140 MANUAL THERAPY 1/> REGIONS: CPT

## 2020-10-21 PROCEDURE — 97110 THERAPEUTIC EXERCISES: CPT

## 2020-10-21 NOTE — FLOWSHEET NOTE
Physical Therapy Daily Treatment Note    Date:  10/21/2020    Patient Name:  Davon Cordova    :  1955  MRN: [id number]  Restrictions/Precautions:    Medical/Treatment Diagnosis Information:  · Diagnosis: M25.512 (ICD-10-CM) - Acute pain of left shoulder, W19. Rodger Lozano (ICD-10-CM) - Fall, subsequent encounter  Insurance/Certification information:  PT Insurance Information: [address] [facility] [facility]  Physician Information:  Referring Practitioner: WALLY Santiago CNP  Plan of care signed (Y/N):  N  Visit# / total visits:       G-Code (if applicable): Quick Dash Calculated Score     Quick Dash Sum Score: 50%      Time in:   7:33     Timed Treatment: 42 Total Treatment Time:  42    ________________________________________________________________________________________    Pain Level:    /10  SUBJECTIVE:  Pt said she is about the same. Not any worse. Pt continues to wake up every night due to shoulder pain. OBJECTIVE:     Exercise/Equipment Resistance/Repetitions Other comments     Cane press x10 HEP     sidelying ER   Sidelying abduction x15  nv HEP  HEP     No $ nv      Scapular retraction  5\"x10 HEP   Scap punches x8 Mild increase in pain      Pec stretch 3 min on foam roll       Wall push up x10      Isometric on wall        Flex        ER         IR   5\"x10  5\"x10  5\"x10                                                                                             Other Therapeutic Activities:    HEP: [id number]       Manual Treatments:       GHJ mobs grade 2-3   Cervical mobs bilateral grade 3-4  First and second rib mob and sustained hold through 3 breaths  Gentle PROM      Modalities:      Test/Measurements:  See Initial Evaluation        ASSESSMENT:  Pt tolerated treatment fair as pt had difficulty performing TE secondary to pain in the L shoulder. HEP established and prescribed this date to improve L shoulder strength and ROM. PT will progress as pt can tolerate.      Treatment/Activity

## 2020-10-23 ENCOUNTER — HOSPITAL ENCOUNTER (OUTPATIENT)
Dept: PHYSICAL THERAPY | Age: 65
Setting detail: THERAPIES SERIES
Discharge: HOME | End: 2020-10-23
Payer: COMMERCIAL

## 2020-10-23 PROCEDURE — 97140 MANUAL THERAPY 1/> REGIONS: CPT

## 2020-10-23 PROCEDURE — 97110 THERAPEUTIC EXERCISES: CPT

## 2020-10-23 NOTE — FLOWSHEET NOTE
Physical Therapy Daily Treatment Note    Date:  10/23/2020    Patient Name:  Olegario Patel    :  1955  MRN: [id number]  Restrictions/Precautions:    Medical/Treatment Diagnosis Information:  · Diagnosis: M25.512 (ICD-10-CM) - Acute pain of left shoulder, W19. Andrea Andrea (ICD-10-CM) - Fall, subsequent encounter  Insurance/Certification information:  PT Insurance Information: [address] [facility]  Physician Information:  Referring Practitioner: WALLY Jin CNP  Plan of care signed (Y/N):  N  Visit# / total visits:  3/12     G-Code (if applicable): Quick Dash Calculated Score 33/55    Quick Dash Sum Score: 50%      Time in:   7:33     Timed Treatment: 43 Total Treatment Time:  43    ________________________________________________________________________________________    Pain Level:    /10  SUBJECTIVE:  Pt said she has the most pain with laying down. When she stands up it gets better. OBJECTIVE:     Exercise/Equipment Resistance/Repetitions Other comments     Cane press x10 HEP     sidelying ER   Sidelying abduction x15 c 2#  x10 HEP  HEP     No $ x10      Scapular retraction  5\"x15 HEP   Scap punches  Mild increase in pain      Pec stretch 3 min on foam roll       Wall push up x12      Isometric on wall        Flex        ER         IR   5\"x10  5\"x10  5\"x10      L neuro re-ed 3 minutes       Front raises x10                                                                               Other Therapeutic Activities:    HEP: [id number]       Manual Treatments:       GHJ mobs grade 2-3   Cervical mobs bilateral grade 3-4  First and second rib mob and sustained hold through 3 breaths  Gentle PROM  Neuro re-ed       Modalities:      Test/Measurements:  See Initial Evaluation        ASSESSMENT:  Pt had decreased L arm pain with all TE this date. Strengthening progressed this date to continue to improve strength. PT will progress as pt can tolerate.      Treatment/Activity Tolerance:   [x]Patient tolerated treatment well [] Patient limited by fatique  [x]Patient limited by pain [] Patient limited by other medical complications  [] Other:     Goals:          Long term goals  Time Frame for Long term goals : 6 weeks  Long term goal 1: Pt will be independent and compliant with HEP  Long term goal 2: Pt will improve LUE strength to 4/5 gross strength  Long term goal 3: Pt will improve LUE ROM to WNL  Long term goal 4: Pt will report at least 75% improvement in frequency and duration of pain  Long term goal 5: Pt will report being able to sleep through the night without waking up secondary to L shoulder pain. Long term goal 5: Pt will improve QuickDash Score by at least 10 points to demonstrate improvement in function. Plan: [] Continue per plan of care [] Alter current plan (see comments)   [x] Plan of care initiated [] Hold pending MD visit [] Discharge      Plan for Next Session:      Re-Certification Due Date:         Signature:   Cyndi Coyle PT

## 2020-10-28 ENCOUNTER — HOSPITAL ENCOUNTER (OUTPATIENT)
Dept: PHYSICAL THERAPY | Age: 65
Setting detail: THERAPIES SERIES
Discharge: HOME | End: 2020-10-28
Payer: COMMERCIAL

## 2020-10-28 PROCEDURE — 97140 MANUAL THERAPY 1/> REGIONS: CPT

## 2020-10-28 PROCEDURE — 97110 THERAPEUTIC EXERCISES: CPT

## 2020-10-28 NOTE — FLOWSHEET NOTE
3rd attempt. Letter sent.   Physical Therapy Daily Treatment Note    Date:  10/28/2020    Patient Name:  Glen Martínez    :  1955  MRN: [id number]  Restrictions/Precautions:    Medical/Treatment Diagnosis Information:  · Diagnosis: M25.512 (ICD-10-CM) - Acute pain of left shoulder, W19. Tracy Dunham (ICD-10-CM) - Fall, subsequent encounter  Insurance/Certification information:  PT Insurance Information: [address] [facility]  Physician Information:  Referring Practitioner: WALLY Maloney CNP  Plan of care signed (Y/N):  N  Visit# / total visits:  3/12     G-Code (if applicable): Quick Dash Calculated Score 33/55    Quick Dash Sum Score: 50%      Time in:   7:35     Timed Treatment: 40 Total Treatment Time:  40    ________________________________________________________________________________________    Pain Level:    /10  SUBJECTIVE:  Pt said she has been feeling better. Pt said she has not been as bad at night. OBJECTIVE:     Exercise/Equipment Resistance/Repetitions Other comments     Cane press    Cane flexion   Cane horizontal abd/add x15  x10  x10 HEP     sidelying ER   Sidelying abduction x15 c 2#  x15 c 1# HEP  HEP     No $   Horizontal abduction  x15  x10 Green  Green      Scapular retraction  5\"x15 HEP   Scap punches  Mild increase in pain      Pec stretch 3 min on foam roll       Wall push up x12      Isometric on wall        Flex        ER         IR   5\"x10  5\"x10  5\"x10      L neuro re-ed 3 minutes 2#      Front raises x10                                                                               Other Therapeutic Activities:    HEP: [id number]       Manual Treatments:       GHJ mobs grade 2-3   Cervical mobs bilateral grade 3-4  First and second rib mob and sustained hold through 3 breaths  Gentle PROM  Gentle ER/IR  Neuro re-ed       Modalities:      Test/Measurements:  See Initial Evaluation        ASSESSMENT:  Pt had decreased L arm pain with all TE this date.  Strengthening progressed this date to continue to improve strength. PT will progress as pt can tolerate. Treatment/Activity Tolerance:   [x]Patient tolerated treatment well [] Patient limited by fatique  [x]Patient limited by pain [] Patient limited by other medical complications  [] Other:     Goals:          Long term goals  Time Frame for Long term goals : 6 weeks  Long term goal 1: Pt will be independent and compliant with HEP  Long term goal 2: Pt will improve LUE strength to 4/5 gross strength  Long term goal 3: Pt will improve LUE ROM to WNL  Long term goal 4: Pt will report at least 75% improvement in frequency and duration of pain  Long term goal 5: Pt will report being able to sleep through the night without waking up secondary to L shoulder pain. Long term goal 5: Pt will improve QuickDash Score by at least 10 points to demonstrate improvement in function. Plan: [] Continue per plan of care [] Alter current plan (see comments)   [x] Plan of care initiated [] Hold pending MD visit [] Discharge      Plan for Next Session:      Re-Certification Due Date:         Signature:   Aristides Olsen PT

## 2020-10-30 ENCOUNTER — HOSPITAL ENCOUNTER (OUTPATIENT)
Dept: PHYSICAL THERAPY | Age: 65
Setting detail: THERAPIES SERIES
Discharge: HOME | End: 2020-10-30
Payer: COMMERCIAL

## 2020-10-30 PROCEDURE — 97140 MANUAL THERAPY 1/> REGIONS: CPT

## 2020-10-30 PROCEDURE — 97110 THERAPEUTIC EXERCISES: CPT

## 2020-10-30 NOTE — FLOWSHEET NOTE
this date to continue to improve strength. PT will progress as pt can tolerate. Treatment/Activity Tolerance:   [x]Patient tolerated treatment well [] Patient limited by fatique  [x]Patient limited by pain [] Patient limited by other medical complications  [] Other:     Goals:          Long term goals  Time Frame for Long term goals : 6 weeks  Long term goal 1: Pt will be independent and compliant with HEP  Long term goal 2: Pt will improve LUE strength to 4/5 gross strength  Long term goal 3: Pt will improve LUE ROM to WNL  Long term goal 4: Pt will report at least 75% improvement in frequency and duration of pain  Long term goal 5: Pt will report being able to sleep through the night without waking up secondary to L shoulder pain. Long term goal 5: Pt will improve QuickDash Score by at least 10 points to demonstrate improvement in function. Plan: [] Continue per plan of care [] Alter current plan (see comments)   [x] Plan of care initiated [] Hold pending MD visit [] Discharge      Plan for Next Session:      Re-Certification Due Date:         Signature:   Brijesh Luu PT

## 2020-11-04 ENCOUNTER — HOSPITAL ENCOUNTER (OUTPATIENT)
Dept: PHYSICAL THERAPY | Age: 65
Setting detail: THERAPIES SERIES
Discharge: HOME | End: 2020-11-04
Payer: COMMERCIAL

## 2020-11-04 PROCEDURE — 97140 MANUAL THERAPY 1/> REGIONS: CPT

## 2020-11-04 PROCEDURE — 97110 THERAPEUTIC EXERCISES: CPT

## 2020-11-04 NOTE — FLOWSHEET NOTE
Physical Therapy Daily Treatment Note    Date:  2020    Patient Name:  Cyrus Jewell    :  1955  MRN: [id number]  Restrictions/Precautions:    Medical/Treatment Diagnosis Information:  · Diagnosis: M25.512 (ICD-10-CM) - Acute pain of left shoulder, W19. Esperanza Degroot (ICD-10-CM) - Fall, subsequent encounter  Insurance/Certification information:  PT Insurance Information: [address] [facility]  Physician Information:  Referring Practitioner: WALLY Jean CNP  Plan of care signed (Y/N):  N  Visit# / total visits:       G-Code (if applicable): Quick Dash Calculated Score 33/55    Quick Dash Sum Score: 50%      Time in:   11:15     Timed Treatment: 45 Total Treatment Time:  45    ________________________________________________________________________________________    Pain Level:    /10  SUBJECTIVE:  Pt said she has been feeling better. Pt said she has not been as bad at night. OBJECTIVE:     Exercise/Equipment Resistance/Repetitions Other comments     Cane press    Cane flexion   Cane horizontal abd/add x15  x10   HEP     sidelying ER   Sidelying abduction x15 c 2#  x15 c 1# HEP  HEP     No $   Horizontal abduction  x15  x10 Green  Green      Scapular retraction  5\"x15 HEP   Scap punches  Mild increase in pain      Pec stretch 3 min on foam roll       Wall push up x12      Isometric on wall        Flex        ER         IR         L neuro re-ed 3 minutes 2#      Front raises   Scaption  x10  x10        UT x10       Pulleys        Flexion         Scaption    2 min  2 min                                                              Other Therapeutic Activities:    HEP: [id number]       Manual Treatments:       GHJ mobs grade 2-3     First and second rib mob and sustained hold through 3 breaths  Gentle PROM  Gentle ER/IR  Neuro re-ed   AC jt mobs inferior       Modalities:      Test/Measurements:  See Initial Evaluation        ASSESSMENT:  Pt had decreased L arm pain with all TE this date.  ROM activities progressed this date. Improved Flexion noted. PT will progress as pt can tolerate. Treatment/Activity Tolerance:   [x]Patient tolerated treatment well [] Patient limited by fatique  [x]Patient limited by pain [] Patient limited by other medical complications  [] Other:     Goals:          Long term goals  Time Frame for Long term goals : 6 weeks  Long term goal 1: Pt will be independent and compliant with HEP  Long term goal 2: Pt will improve LUE strength to 4/5 gross strength  Long term goal 3: Pt will improve LUE ROM to WNL  Long term goal 4: Pt will report at least 75% improvement in frequency and duration of pain  Long term goal 5: Pt will report being able to sleep through the night without waking up secondary to L shoulder pain. Long term goal 5: Pt will improve QuickDash Score by at least 10 points to demonstrate improvement in function. Plan: [] Continue per plan of care [] Alter current plan (see comments)   [x] Plan of care initiated [] Hold pending MD visit [] Discharge      Plan for Next Session:      Re-Certification Due Date:         Signature:   Cyndi Coyle PT

## 2020-11-06 ENCOUNTER — HOSPITAL ENCOUNTER (OUTPATIENT)
Dept: PHYSICAL THERAPY | Age: 65
Setting detail: THERAPIES SERIES
Discharge: HOME | End: 2020-11-06
Payer: COMMERCIAL

## 2020-11-06 PROCEDURE — 97140 MANUAL THERAPY 1/> REGIONS: CPT

## 2020-11-06 PROCEDURE — 97110 THERAPEUTIC EXERCISES: CPT

## 2020-11-06 NOTE — FLOWSHEET NOTE
Physical Therapy Daily Treatment Note    Date:  2020    Patient Name:  Marlee Gutierrez    :  1955  MRN: [id number]  Restrictions/Precautions:    Medical/Treatment Diagnosis Information:  · Diagnosis: M25.512 (ICD-10-CM) - Acute pain of left shoulder, W19. Cele Dural (ICD-10-CM) - Fall, subsequent encounter  Insurance/Certification information:  PT Insurance Information: [address]  Physician Information:  Referring Practitioner: WALLY Cuellar CNP  Plan of care signed (Y/N):  N  Visit# / total visits:       G-Code (if applicable): Quick Dash Calculated Score 33/55    Quick Dash Sum Score: 50%      Time in:   10:32     Timed Treatment: 43 Total Treatment Time:  43    ________________________________________________________________________________________    Pain Level:    /10  SUBJECTIVE:  Pt said she was a little more sore after her last session. Pt said she had some pain when she was sleeping    OBJECTIVE:     Exercise/Equipment Resistance/Repetitions Other comments     Cane press    Cane flexion   Cane horizontal abd/add    HEP     sidelying ER   Sidelying abduction x15 c 2#  x15 c 1# HEP  HEP     No $   Horizontal abduction   Green  Green      Scapular retraction  5\"x15 HEP   Scap punches  Mild increase in pain      Pec stretch 3 min on foam roll       Wall push up x12      Isometric on wall        Flex        ER         IR         L neuro re-ed 3 minutes 2#      Front raises   Scaption  x10  x10        UT x10       Pulleys        Flexion         Scaption                                                                  Other Therapeutic Activities:    HEP: [id number]       Manual Treatments:       GHJ mobs grade 2-3     First and second rib mob and sustained hold through 3 breaths  Gentle PROM  Gentle ER/IR  Neuro re-ed   AC jt mobs inferior       Modalities:      Test/Measurements:  See Initial Evaluation        ASSESSMENT:  Pt had increased L arm pain with all TE this date.  ROM activities progressed this date. Improved Flexion noted. PT will progress as pt can tolerate. Treatment/Activity Tolerance:   [x]Patient tolerated treatment well [] Patient limited by fatique  [x]Patient limited by pain [] Patient limited by other medical complications  [] Other:     Goals:          Long term goals  Time Frame for Long term goals : 6 weeks  Long term goal 1: Pt will be independent and compliant with HEP  Long term goal 2: Pt will improve LUE strength to 4/5 gross strength  Long term goal 3: Pt will improve LUE ROM to WNL  Long term goal 4: Pt will report at least 75% improvement in frequency and duration of pain  Long term goal 5: Pt will report being able to sleep through the night without waking up secondary to L shoulder pain. Long term goal 5: Pt will improve QuickDash Score by at least 10 points to demonstrate improvement in function. Plan: [] Continue per plan of care [] Alter current plan (see comments)   [x] Plan of care initiated [] Hold pending MD visit [] Discharge      Plan for Next Session:      Re-Certification Due Date:         Signature:   Shira Ronquillo PT

## 2020-11-11 ENCOUNTER — HOSPITAL ENCOUNTER (OUTPATIENT)
Dept: PHYSICAL THERAPY | Age: 65
Setting detail: THERAPIES SERIES
Discharge: HOME | End: 2020-11-11
Payer: COMMERCIAL

## 2020-11-11 PROCEDURE — 97110 THERAPEUTIC EXERCISES: CPT

## 2020-11-11 PROCEDURE — 97140 MANUAL THERAPY 1/> REGIONS: CPT

## 2020-11-11 NOTE — FLOWSHEET NOTE
Physical Therapy Daily Treatment Note    Date:  2020    Patient Name:  Cyrus Jewell    :  1955  MRN: [id number]  Restrictions/Precautions:    Medical/Treatment Diagnosis Information:  · Diagnosis: M25.512 (ICD-10-CM) - Acute pain of left shoulder, W19. Esperanza Degroot (ICD-10-CM) - Fall, subsequent encounter  Insurance/Certification information:  PT Insurance Information: [address]  Physician Information:  Referring Practitioner: WALLY Jean CNP  Plan of care signed (Y/N):  N  Visit# / total visits:       G-Code (if applicable): Quick Dash Calculated Score 33/55    Quick Dash Sum Score: 50%    Quick Dash Sum Score 25/55  Quick Dash Calculated score  31%      Time in:   12:00     Timed Treatment: 41 Total Treatment Time:  45    ________________________________________________________________________________________    Pain Level:    /10  SUBJECTIVE:  Pt said she has been taking advil before bed and that seems to help with her pain at night. Pt says it has been roughly the same.      OBJECTIVE:     Exercise/Equipment Resistance/Repetitions Other comments     Cane press    Cane flexion   Cane horizontal abd/add    HEP     sidelying ER   Sidelying abduction x15 c 2#  x15 c 1# HEP  HEP     No $   Horizontal abduction   Green  Green      Scapular retraction  5\"x15 HEP   Scap punches  Mild increase in pain      Pec stretch 4 min on foam roll       Wall push up x10      Isometric on wall        Flex        ER         IR         L neuro re-ed       Front raises   Scaption  x10  x10        UT x10    Prone       Extension        Rows       Horizontal abduction    x10  x10  x10       Pulleys        Flexion         Scaption                                                                  Other Therapeutic Activities:    HEP: [id number]       Manual Treatments:       GHJ mobs grade 2-3     First and second rib mob and sustained hold through 3 breaths  Gentle PROM  Gentle ER/IR        Modalities: Test/Measurements:    Strength LUE  Strength LUE: Exception  L Shoulder Flexion: 4-/5  L Shoulder ABduction: 3+/5  L Shoulder Internal Rotation: 4/5  L Shoulder External Rotation: 4-/5       · ASSESSMENT:  Pt has made minimal progress over the first 4 weeks of therapy. Pt has reported compliance with HEP performing the exercises 1x/day. Pt said frequency and intensity of pain has improved by about 20%. However, pt continues to have significant increase in pain during reaching and at night when sleeping. Pt continues to be limited with L shoulder ROM. Pt achieved ~90 degrees of abduction and ~25 degrees of ER at 90 degrees of abduction. Pt has pain with palpation in L biceps tendon and RTC insertion. Pt continues to report that pain wakes her up at least 1x/day. Pt has made strength improvements, but has continued to demonstrate weakness in ER (4-/5)  and abduction (3+/5). Pt recorded a 25/55 (was 33/55) on the Quickdash indicating improvement. PT recommending pt call and schedule follow up visit with MD to explore options and for potential MRI. Pt reported she would like to hold PT until follow up visit is completed. PT will be continued based of MD recommendation during follow up.        Treatment/Activity Tolerance:   [x]Patient tolerated treatment well [] Patient limited by fatique  [x]Patient limited by pain [] Patient limited by other medical complications  [] Other:     Goals:          Long term goals  Time Frame for Long term goals : 6 weeks  Long term goal 1: Pt will be independent and compliant with HEP (Goal Met)  Long term goal 2: Pt will improve LUE strength to 4/5 gross strength  Long term goal 3: Pt will improve LUE ROM to WNL (Goal Not Met)  Long term goal 4: Pt will report at least 75% improvement in frequency and duration of pain (Progressing- 20% improvement.  Continued pain with reaching and at night)  Long term goal 5: Pt will report being able to sleep through the night without waking up secondary to L shoulder pain. (Goal Not Met)   Long term goal 5: Pt will improve QuickDash Score by at least 10 points to demonstrate improvement in function. (Progressing- 8 points)    Plan: [] Continue per plan of care [] Alter current plan (see comments)   [x] Plan of care initiated [] Hold pending MD visit [] Discharge      Plan for Next Session:      Re-Certification Due Date:         Signature:   Albania Pressley PT

## 2020-11-11 NOTE — PROGRESS NOTES
up.       Progress towards goals:    Long term goals  Time Frame for Long term goals : 6 weeks  Long term goal 1: Pt will be independent and compliant with HEP (Goal Met)  Long term goal 2: Pt will improve LUE strength to 4/5 gross strength  Long term goal 3: Pt will improve LUE ROM to WNL (Goal Not Met)  Long term goal 4: Pt will report at least 75% improvement in frequency and duration of pain (Progressing- 20% improvement. Continued pain with reaching and at night)  Long term goal 5: Pt will report being able to sleep through the night without waking up secondary to L shoulder pain. (Goal Not Met)   Long term goal 5: Pt will improve QuickDash Score by at least 10 points to demonstrate improvement in function.  (Progressing- 8 points)    Frequency/Duration:  # Days per week: [] 1 day # Weeks: [] 1 week [] 4 weeks      [x] 2 days   [] 2 weeks [] 5 weeks     [] 3 days   [] 3 weeks [x] 6 weeks     Rehab Potential: [] Excellent [x] Good [] Fair  [] Poor     Goal Status:  [] Achieved [x] Partially Achieved  [] Not Achieved     Patient Status: [x] Continue per initial plan of Care (per MD)     [] Patient now discharged     [] Additional visits requested, Please re-certify for additional visits:      Requested frequency/duration:  X/week for weeks    Electronically signed by:  Lori Beltran PT    If you have any questions or concerns, please don't hesitate to call.   Thank you for your referral.    Physician Signature:________________________________Date:__________________  By signing above, therapists plan is approved by physician

## 2020-11-12 ENCOUNTER — HOSPITAL ENCOUNTER (OUTPATIENT)
Age: 65
Discharge: HOME | End: 2020-11-12
Payer: COMMERCIAL

## 2020-11-12 ENCOUNTER — HOSPITAL ENCOUNTER (OUTPATIENT)
Dept: WOMENS IMAGING | Age: 65
Discharge: HOME | End: 2020-11-12
Payer: COMMERCIAL

## 2020-11-12 LAB — POTASSIUM SERPL-SCNC: 4.5 MMOL/L (ref 3.5–5.1)

## 2020-11-12 PROCEDURE — 36415 COLL VENOUS BLD VENIPUNCTURE: CPT

## 2020-11-12 PROCEDURE — 84132 ASSAY OF SERUM POTASSIUM: CPT

## 2020-11-12 PROCEDURE — 77063 BREAST TOMOSYNTHESIS BI: CPT

## 2020-11-13 ENCOUNTER — APPOINTMENT (OUTPATIENT)
Dept: PHYSICAL THERAPY | Age: 65
End: 2020-11-13
Payer: COMMERCIAL

## 2020-11-16 ENCOUNTER — OFFICE VISIT (OUTPATIENT)
Dept: INTERNAL MEDICINE CLINIC | Age: 65
End: 2020-11-16
Payer: COMMERCIAL

## 2020-11-16 VITALS
TEMPERATURE: 97.6 F | OXYGEN SATURATION: 100 % | BODY MASS INDEX: 28.17 KG/M2 | WEIGHT: 154 LBS | SYSTOLIC BLOOD PRESSURE: 134 MMHG | DIASTOLIC BLOOD PRESSURE: 78 MMHG | HEART RATE: 81 BPM

## 2020-11-16 PROCEDURE — 99213 OFFICE O/P EST LOW 20 MIN: CPT | Performed by: NURSE PRACTITIONER

## 2020-11-16 RX ORDER — DAPSONE 75 MG/G
GEL TOPICAL
COMMUNITY
Start: 2020-09-25

## 2020-11-16 ASSESSMENT — ENCOUNTER SYMPTOMS
COUGH: 0
SINUS PRESSURE: 0
DIARRHEA: 0
FACIAL SWELLING: 0
NAUSEA: 0
VOMITING: 0
SORE THROAT: 0

## 2020-11-16 NOTE — PROGRESS NOTES
Johana Saez [facility]  1955      HPI:  Chief Complaint   Patient presents with    Shoulder Injury     Right , tx: xray and therapy , sx pain worst at night. In September she fell backwards onto L arm/elbow/shoulder. She continues to have pain at nighttime. Achiness. Lt shoulder into Lt forearm. No numbness or tingling in L hand. Pain with rotation. Has performed xray and PT. PT recommended f/u with PCP. Advil, patches, topical agents - minimal relief. C/o CTS that Dr Ferreira Part dx but has not resolved in 6 weeks with IBU. /78 (Site: Right Upper Arm, Position: Sitting, Cuff Size: Large Adult)   Pulse 81   Temp 97.6 °F (36.4 °C) (Temporal)   Wt 154 lb (69.9 kg)   SpO2 100%   BMI 28.17 kg/m²     Prior to Visit Medications    Medication Sig Taking? Authorizing Provider   ACZONE 7.5 % GEL MABLE EXT TO FACE QD Yes Historical Provider, MD   Apoaequorin (PREVAGEN PO) Take by mouth Yes Historical Provider, MD   GLUCOSAMINE-CHONDROITIN PO Take by mouth Yes Historical Provider, MD   MAGNESIUM PO Take by mouth Yes Historical Provider, MD   Cyanocobalamin (VITAMIN B-12) 1000 MCG CR tablet Take 1,000 mcg by mouth daily Yes Historical Provider, MD   Cholecalciferol (VITAMIN D3) 2000 UNITS CAPS Take 1 capsule by mouth daily Yes Historical Provider, MD   calcium carbonate (OSCAL) 500 MG TABS tablet Take 500 mg by mouth daily Yes Historical Provider, MD   Lactobacillus (PROBIOTIC ACIDOPHILUS PO) Take 1 capsule by mouth daily Yes Historical Provider, MD   spironolactone (ALDACTONE) 100 MG tablet Take 1.5 tablets by mouth daily. Takes one and a half daily FOR ACNE  Patient taking differently:   Take 200 mg by mouth daily Takes one and a half daily FOR ACNE Yes Jarek Andino MD   Tretinoin, Facial Wrinkles, (TRETINOIN, EMOLLIENT,) 0.05 % CREA Apply  topically.  Face  Indications: for acne Yes Historical Provider, MD     Family History   Problem Relation Age of Onset    Heart Disease Mother    [facility] Heart Disease Father     Heart Disease Brother      Social History     Socioeconomic History    Marital status:      Spouse name: Not on file    Number of children: Not on file    Years of education: Not on file    Highest education level: Not on file   Occupational History    Not on file   Social Needs    Financial resource strain: Not on file    Food insecurity     Worry: Not on file     Inability: Not on file    Transportation needs     Medical: Not on file     Non-medical: Not on file   Tobacco Use    Smoking status: Former Smoker     Packs/day: 0.25     Years: 5.00     Pack years: 1.25     Last attempt to quit: 1998     Years since quittin.0    Smokeless tobacco: Never Used   Substance and Sexual Activity    Alcohol use: Yes     Alcohol/week: 7.0 standard drinks     Types: 7 Glasses of wine per week    Drug use: No    Sexual activity: Yes     Partners: Male   Lifestyle    Physical activity     Days per week: Not on file     Minutes per session: Not on file    Stress: Not on file   Relationships    Social connections     Talks on phone: Not on file     Gets together: Not on file     Attends Anabaptist service: Not on file     Active member of club or organization: Not on file     Attends meetings of clubs or organizations: Not on file     Relationship status: Not on file    Intimate partner violence     Fear of current or ex partner: Not on file     Emotionally abused: Not on file     Physically abused: Not on file     Forced sexual activity: Not on file   Other Topics Concern    Not on file   Social History Narrative    Not on file       Review of Systems   Constitutional: Negative for appetite change, chills, fatigue, fever and unexpected weight change. HENT: Negative for congestion, ear discharge, ear pain, facial swelling, hearing loss, sinus pressure, sneezing and sore throat. Respiratory: Negative for cough. Cardiovascular: Negative for chest pain. Gastrointestinal: Negative for diarrhea, nausea and vomiting. Genitourinary: Negative for difficulty urinating, dysuria, hematuria and urgency. Musculoskeletal: Positive for arthralgias (L shoulder pain, chronic). Negative for gait problem. Neurological: Negative for dizziness, weakness and headaches. Hematological: Negative for adenopathy. Psychiatric/Behavioral: Negative for sleep disturbance and suicidal ideas. Physical Exam  Constitutional:       Appearance: Normal appearance. She is normal weight. HENT:      Head: Normocephalic. Cardiovascular:      Rate and Rhythm: Normal rate and regular rhythm. Pulses: Normal pulses. Heart sounds: Normal heart sounds. Pulmonary:      Effort: Pulmonary effort is normal.      Breath sounds: Normal breath sounds. Musculoskeletal:      Comments: Tenderness over anterior aspect of L shoulder. Pain with rotation. Strength 4/5 on extension. Neurological:      General: No focal deficit present. Mental Status: She is alert and oriented to person, place, and time. Psychiatric:         Mood and Affect: Mood normal.         Thought Content: Thought content normal.         Judgment: Judgment normal.         Assessment:     1. Chronic left shoulder pain  Order MRI. D/c PT at this time until MRI results return. 2. Left Hand Paresthesia  Monitor. NSAIDs as needed. Enc to wear a brace at night when sleeping to improve CTS symptoms. Consider EMG in future      Plan:    See above plan. Return if symptoms worsen or fail to improve.     Damon Shannon, WALLY - CNP

## 2020-11-16 NOTE — PATIENT INSTRUCTIONS
Wear brace to sleep for Right Wrist and Carpal Tunnel     Schedule MRI shoulder [phone]    Use Advil as needed.

## 2020-11-18 ENCOUNTER — APPOINTMENT (OUTPATIENT)
Dept: PHYSICAL THERAPY | Age: 65
End: 2020-11-18
Payer: COMMERCIAL

## 2020-11-20 ENCOUNTER — APPOINTMENT (OUTPATIENT)
Dept: PHYSICAL THERAPY | Age: 65
End: 2020-11-20
Payer: COMMERCIAL

## 2020-11-20 ENCOUNTER — HOSPITAL ENCOUNTER (OUTPATIENT)
Dept: MRI IMAGING | Age: 65
Discharge: HOME | End: 2020-11-20
Payer: COMMERCIAL

## 2020-11-20 PROCEDURE — 73221 MRI JOINT UPR EXTREM W/O DYE: CPT

## 2020-11-25 ENCOUNTER — APPOINTMENT (OUTPATIENT)
Dept: PHYSICAL THERAPY | Age: 65
End: 2020-11-25
Payer: COMMERCIAL

## 2020-11-27 ENCOUNTER — OFFICE VISIT (OUTPATIENT)
Dept: ORTHOPEDIC SURGERY | Age: 65
End: 2020-11-27
Payer: COMMERCIAL

## 2020-11-27 ENCOUNTER — TELEPHONE (OUTPATIENT)
Dept: ORTHOPEDIC SURGERY | Age: 65
End: 2020-11-27

## 2020-11-27 VITALS — BODY MASS INDEX: 28.34 KG/M2 | HEIGHT: 62 IN | WEIGHT: 154 LBS

## 2020-11-27 PROCEDURE — L3670 SO ACRO/CLAV CAN WEB PRE OTS: HCPCS | Performed by: ORTHOPAEDIC SURGERY

## 2020-11-27 PROCEDURE — 99203 OFFICE O/P NEW LOW 30 MIN: CPT | Performed by: ORTHOPAEDIC SURGERY

## 2020-11-27 NOTE — PROGRESS NOTES
DILATION AND CURETTAGE OF UTERUS      missed ab    HYSTERECTOMY, TOTAL ABDOMINAL  11/15/2011     Current Medications:     Current Outpatient Medications:     ACZONE 7.5 % GEL, MABLE EXT TO FACE QD, Disp: , Rfl:     Apoaequorin (PREVAGEN PO), Take by mouth, Disp: , Rfl:     GLUCOSAMINE-CHONDROITIN PO, Take by mouth, Disp: , Rfl:     MAGNESIUM PO, Take by mouth, Disp: , Rfl:     Cyanocobalamin (VITAMIN B-12) 1000 MCG CR tablet, Take 1,000 mcg by mouth daily, Disp: , Rfl:     Cholecalciferol (VITAMIN D3) 2000 UNITS CAPS, Take 1 capsule by mouth daily, Disp: , Rfl:     calcium carbonate (OSCAL) 500 MG TABS tablet, Take 500 mg by mouth daily, Disp: , Rfl:     Lactobacillus (PROBIOTIC ACIDOPHILUS PO), Take 1 capsule by mouth daily, Disp: , Rfl:     spironolactone (ALDACTONE) 100 MG tablet, Take 1.5 tablets by mouth daily. Takes one and a half daily FOR ACNE (Patient taking differently:  Take 200 mg by mouth daily Takes one and a half daily FOR ACNE), Disp: 45 tablet, Rfl: 0    Tretinoin, Facial Wrinkles, (TRETINOIN, EMOLLIENT,) 0.05 % CREA, Apply  topically. Face  Indications: for acne, Disp: , Rfl:   Allergies:  Patient has no known allergies. Social History:    reports that she quit smoking about 22 years ago. She has a 1.25 pack-year smoking history. She has never used smokeless tobacco. She reports current alcohol use of about 7.0 standard drinks of alcohol per week. She reports that she does not use drugs. Family History:   Family History   Problem Relation Age of Onset    Heart Disease Mother     Heart Disease Father     Heart Disease Brother        REVIEW OF SYSTEMS:   For new problems, a full review of systems will be found scanned in the patient's chart.   CONSTITUTIONAL: Denies unexplained weight loss, fevers, chills   NEUROLOGICAL: Denies unsteady gait or progressive weakness  SKIN: Denies skin changes, delayed healing, rash, itching       PHYSICAL EXAM:    Vitals: Height 5' 2.01\" (1.575 m), weight positioning carefully intravenous antibiotics preoperative, etc. All questions were answered from the patient. I have personally performed and/or participated in the history, exam and medical decision making and agree with all pertinent clinical information. I have also reviewed and agree with the past medical, family and social history unless otherwise noted. This dictation was performed with a verbal recognition program ([employer]) and it was checked for errors. It is possible that there are still dictated errors within this office note. If so, please bring any errors to my attention for an addendum. All efforts were made to ensure that this office note is accurate.           Paul Christian MD

## 2020-11-30 ENCOUNTER — TELEPHONE (OUTPATIENT)
Dept: ORTHOPEDIC SURGERY | Age: 65
End: 2020-11-30

## 2020-11-30 ENCOUNTER — TELEPHONE (OUTPATIENT)
Dept: INTERNAL MEDICINE CLINIC | Age: 65
End: 2020-11-30

## 2020-11-30 ENCOUNTER — APPOINTMENT (OUTPATIENT)
Dept: PHYSICAL THERAPY | Age: 65
End: 2020-11-30
Payer: COMMERCIAL

## 2020-11-30 NOTE — TELEPHONE ENCOUNTER
Called pt and lvm informing there are no openings for pre-op visit prior to her surgery date. Advised pt to call her surgeon's office to see what they recommend.

## 2020-11-30 NOTE — PROGRESS NOTES
Physical Therapy        Physical Therapy  Cancellation and Discharge Note  Patient Name:  Gina Haskins  :  1955   Date:  2020  Cancels to date: 1  No-shows to date: 0    For today's appointment patient:  [x] Cancelled  [] Rescheduled appointment  [] No-show     Reason given by patient:  [] Patient ill  [] Conflicting appointment  [] No transportation    [] Conflict with work  [] No reason given  [x] Other:     Comments:  Pt called and reported that she would like to cancel all of her remaining visits due to the surge in COVID-19. Therefore, pt is now D/C. Let last progress note serve as pt discharge.     Electronically signed by:  Romulo Go PT

## 2020-11-30 NOTE — TELEPHONE ENCOUNTER
----- Message from [employer] sent at 11/28/2020 12:11 PM EST -----  Subject: Appointment Request    Reason for Call: Routine Pre-Op    QUESTIONS  Type of Appointment? Established Patient  Reason for appointment request? Available appointments did not meet   patient need  Additional Information for Provider? patient needs pre op for surgery on   Dec 15 on rotator cup with Dr Lotus Colunga at [facility] EKG needed . please   call to schedule   ---------------------------------------------------------------------------  --------------  CALL BACK INFO  What is the best way for the office to contact you? OK to leave message on   voicemail  Preferred Call Back Phone Number? [phone]  ---------------------------------------------------------------------------  --------------  SCRIPT ANSWERS  Relationship to Patient? Self  Appointment reason? Symptomatic  Select script based on patient symptoms? Adult Pre-Op  Do you have question for your provider that need to be answered prior to   scheduling your pre-op appointment? No  Have you been diagnosed with   tested for   or told that you are suspected of having COVID-19 (Coronavirus)? No  Have you had a fever or taken medication to treat a fever within the past   3 days? No  Have you had a cough   shortness of breath or flu-like symptoms within the past 3 days? No  Do you currently have flu-like symptoms including fever or chills   cough   shortness of breath   or difficulty breathing   or new loss of taste or smell? No  (Service Expert  click yes below to proceed with [employer] As Usual   Scheduling)?  Yes

## 2020-11-30 NOTE — TELEPHONE ENCOUNTER
General Question     Subject: PRE-OP PHYSICAL  Patient and /or Facility Request: Zahraa Sun  Contact Number: [phone]

## 2020-12-01 ENCOUNTER — TELEPHONE (OUTPATIENT)
Dept: INTERNAL MEDICINE CLINIC | Age: 65
End: 2020-12-01

## 2020-12-01 NOTE — TELEPHONE ENCOUNTER
----- Message from Porsche Chavarria sent at 11/30/2020  5:44 PM EST -----  Subject: Appointment Request    Reason for Call: Routine Pre-Op    QUESTIONS  Type of Appointment? Established Patient  Reason for appointment request? No appointments available during search  Additional Information for Provider? Pt wanted to know if her pcp could   get a VV in with her   pre op appt needed   (12/15 is procedure at [address] [place] [address]   left rotator cuff surgery   Dr. Sahara Lu. Needs EKG but that can be performed at the center at time   of procedure)  ---------------------------------------------------------------------------  --------------  CALL BACK INFO  What is the best way for the office to contact you? OK to leave message on   voicemail  Preferred Call Back Phone Number? [phone]  ---------------------------------------------------------------------------  --------------  SCRIPT ANSWERS  Relationship to Patient? Self  Appointment reason? Symptomatic  Select script based on patient symptoms? Adult Pre-Op  Do you have question for your provider that need to be answered prior to   scheduling your pre-op appointment? No  Have you been diagnosed with   tested for   or told that you are suspected of having COVID-19 (Coronavirus)? Yes  Did your symptoms begin or have you been tested for COVID-19 in the last   10 days? No  Have you had a fever or taken medication to treat a fever within the past   3 days? No  Have you had a cough   shortness of breath or flu-like symptoms within the past 3 days? No  Do you currently have flu-like symptoms including fever or chills   cough   shortness of breath   or difficulty breathing   or new loss of taste or smell? No  (Service Expert  click yes below to proceed with [employer] As Usual   Scheduling)?  Yes

## 2020-12-02 ENCOUNTER — HOSPITAL ENCOUNTER (OUTPATIENT)
Dept: PHYSICAL THERAPY | Age: 65
Setting detail: THERAPIES SERIES
Discharge: HOME | End: 2020-12-02
Payer: COMMERCIAL

## 2020-12-03 ENCOUNTER — TELEPHONE (OUTPATIENT)
Dept: ORTHOPEDIC SURGERY | Age: 65
End: 2020-12-03

## 2020-12-04 ENCOUNTER — TELEPHONE (OUTPATIENT)
Dept: ORTHOPEDIC SURGERY | Age: 65
End: 2020-12-04

## 2020-12-07 NOTE — PROGRESS NOTES
Triplett Earthly    Age 63 y.o.    female    1955    MRN [id number]    12/15/2020  Arrival Time_____________  OR Time____________[address]     Procedure(s):  EXAM UNDER ANESTHESIA VIDEO ARTHROSCOPY LEFT SHOULDER, MINI-OPEN ROTATOR CUFF REPAIR, NEER ACROMIOPLASTY AND KALEIGH PROCEDURE WITH EXPAREL                      General    Surgeon(s):  Favian Cardoza, MD       Phone [phone] (home) [phone] (work)    Initals  Date  [address]  Cell Work  _________________________________________________________________  _________________________________________________________________  _________________________________________________________________  _________________________________________________________________  _________________________________________________________________      PCP _____________________________ Phone_________________       H&P__________________Bringing    Chart            Epic  DOS     Called_______  EKG__________________Bringing    Chart            Epic  DOS     Called_______  LAB__________________ Bringing    Chart            Epic  DOS     Called_______  Cardiac Clearance_______Bringing    Chart            Epic      DOS       Called_______    Cardiologist________________________ Phone___________________________      ? [employer] concerns / Waiver on Chart            PAT Communications_____________  ? Pre-op Instructions Given [place]          ______________________________  ? Directions to Surgery Center                          ______________________________  ? Transportation Home_______________      _______________________________  ?  Crutches/Walker__________________        _______________________________      ________Pre-op Orders   _______Transcribed    _______Wt.  ________Pharmacy          _______SCD  ______VTE     ______Beta Blocker  ________Consent             ________TED Pepe Bolk

## 2020-12-08 NOTE — PROGRESS NOTES
Obstructive Sleep Apnea (TOBY) Screening     Patient:  Gina Urena    YOB: 1955      Medical Record #:  [id number]                     Date:  12/8/2020     1. Are you a loud and/or regular snorer? []  Yes       [x] No    2. Have you been observed to gasp or stop breathing during sleep? []  Yes       [x] No    3. Do you feel tired or groggy upon awakening or do you awaken with a headache?           []  Yes       [] No    4. Are you often tired or fatigued during the wake time hours? []  Yes       [] No    5. Do you fall asleep sitting, reading, watching TV or driving? []  Yes       [] No    6. Do you often have problems with memory or concentration? []  Yes       [] No    **If patient's score is ? 3 they are considered high risk for TOBY. An Anesthesia provider will evaluate the patient and develop a plan of care the day of surgery. Note:  If the patient's BMI is more than 35 kg m¯² , has neck circumference > 40 cm, and/or high blood pressure the risk is greater (© American Sleep Apnea Association, 2006).

## 2020-12-09 ENCOUNTER — OFFICE VISIT (OUTPATIENT)
Dept: INTERNAL MEDICINE CLINIC | Age: 65
End: 2020-12-09
Payer: COMMERCIAL

## 2020-12-09 ENCOUNTER — TELEPHONE (OUTPATIENT)
Dept: ORTHOPEDIC SURGERY | Age: 65
End: 2020-12-09

## 2020-12-09 ENCOUNTER — HOSPITAL ENCOUNTER (OUTPATIENT)
Age: 65
Discharge: HOME | End: 2020-12-09
Payer: COMMERCIAL

## 2020-12-09 VITALS
OXYGEN SATURATION: 97 % | SYSTOLIC BLOOD PRESSURE: 140 MMHG | BODY MASS INDEX: 27.8 KG/M2 | WEIGHT: 152 LBS | DIASTOLIC BLOOD PRESSURE: 68 MMHG | HEART RATE: 113 BPM | TEMPERATURE: 97.3 F

## 2020-12-09 LAB
A/G RATIO: 1.6 (ref 1.1–2.2)
ALBUMIN SERPL-MCNC: 4.5 G/DL (ref 3.4–5)
ALP BLD-CCNC: 60 U/L (ref 40–129)
ALT SERPL-CCNC: 18 U/L (ref 10–40)
ANION GAP SERPL CALCULATED.3IONS-SCNC: 10 MMOL/L (ref 3–16)
AST SERPL-CCNC: 18 U/L (ref 15–37)
BILIRUB SERPL-MCNC: 0.4 MG/DL (ref 0–1)
BUN BLDV-MCNC: 13 MG/DL (ref 7–20)
CALCIUM SERPL-MCNC: 9.8 MG/DL (ref 8.3–10.6)
CHLORIDE BLD-SCNC: 101 MMOL/L (ref 99–110)
CO2: 28 MMOL/L (ref 21–32)
CREAT SERPL-MCNC: 1 MG/DL (ref 0.6–1.2)
GFR AFRICAN AMERICAN: >60
GFR NON-AFRICAN AMERICAN: 56
GLOBULIN: 2.9 G/DL
GLUCOSE BLD-MCNC: 107 MG/DL (ref 70–99)
POTASSIUM SERPL-SCNC: 4.5 MMOL/L (ref 3.5–5.1)
SODIUM BLD-SCNC: 139 MMOL/L (ref 136–145)
TOTAL PROTEIN: 7.4 G/DL (ref 6.4–8.2)

## 2020-12-09 PROCEDURE — 99214 OFFICE O/P EST MOD 30 MIN: CPT | Performed by: NURSE PRACTITIONER

## 2020-12-09 PROCEDURE — 80053 COMPREHEN METABOLIC PANEL: CPT

## 2020-12-09 PROCEDURE — 36415 COLL VENOUS BLD VENIPUNCTURE: CPT

## 2020-12-09 PROCEDURE — 93000 ELECTROCARDIOGRAM COMPLETE: CPT | Performed by: NURSE PRACTITIONER

## 2020-12-09 NOTE — PROGRESS NOTES
Preoperative Consultation      Carolin Shukla  YOB: 1955    Date of Service:  12/9/2020    Vitals:    12/09/20 0922   BP: (!) 140/68   Pulse: 113   Temp: 97.3 °F (36.3 °C)   SpO2: 97%   Weight: 152 lb (68.9 kg)      Wt Readings from Last 2 Encounters:   12/09/20 152 lb (68.9 kg)   11/27/20 154 lb (69.9 kg)     BP Readings from Last 3 Encounters:   12/09/20 (!) 140/68   11/16/20 134/78   09/15/20 135/75        Chief Complaint   Patient presents with   [facility] Pre-op Exam     No Known Allergies  Outpatient Medications Marked as Taking for the 12/9/20 encounter (Office Visit) with WALLY Coe CNP   Medication Sig Dispense Refill    ACZONE 7.5 % GEL MABLE EXT TO FACE QD      Apoaequorin (PREVAGEN PO) Take by mouth      GLUCOSAMINE-CHONDROITIN PO Take by mouth      MAGNESIUM PO Take by mouth      Cyanocobalamin (VITAMIN B-12) 1000 MCG CR tablet Take 1,000 mcg by mouth daily      Cholecalciferol (VITAMIN D3) 2000 UNITS CAPS Take 1 capsule by mouth daily      calcium carbonate (OSCAL) 500 MG TABS tablet Take 500 mg by mouth daily      Lactobacillus (PROBIOTIC ACIDOPHILUS PO) Take 1 capsule by mouth daily      spironolactone (ALDACTONE) 100 MG tablet Take 1.5 tablets by mouth daily. Takes one and a half daily FOR ACNE (Patient taking differently:   Take 200 mg by mouth daily Takes one and a half daily FOR ACNE) 45 tablet 0    Tretinoin, Facial Wrinkles, (TRETINOIN, EMOLLIENT,) 0.05 % CREA Apply  topically. Face  Indications: for acne         This patient presents to the office today for a preoperative consultation at the request of surgeon, Dr. David Phillips, who plans on performing EXAM UNDER ANESTHESIA VIDEO ARTHROSCOPY LEFT SHOULDER, MINI-OPEN ROTATOR CUFF REPAIR, NEER ACROMIOPLASTY AND KALEIGH PROCEDURE WITH EXPAREL on December 15 at [facility].  The current problem began 3 months ago, and symptoms have been stable with time.   Conservative therapy: Yes: PT, which has been not very effective. .    Planned anesthesia: General   Known anesthesia problems: None   Bleeding risk: No recent or remote history of abnormal bleeding  Personal or FH of DVT/PE: No    Patient objection to receiving blood products: No    Patient Active Problem List   Diagnosis    Acne    Tinnitus    Insomnia       Past Medical History:   Diagnosis Date    Acne     Uterine fibroids affecting pregnancy 10/11     Past Surgical History:   Procedure Laterality Date     SECTION      X4    COLONOSCOPY  03    WNL    DENTAL SURGERY      as child    DILATION AND CURETTAGE OF UTERUS      missed ab    HYSTERECTOMY, TOTAL ABDOMINAL  11/15/2011     Family History   Problem Relation Age of Onset    Heart Disease Mother     Heart Disease Father     Heart Disease Brother     Heart Disease Brother     Heart Disease Brother     Heart Disease Brother     Heart Disease Brother     Cancer Sister      Social History     Socioeconomic History    Marital status:      Spouse name: Not on file    Number of children: Not on file    Years of education: Not on file    Highest education level: Not on file   Occupational History    Not on file   Social Needs    Financial resource strain: Not on file    Food insecurity     Worry: Not on file     Inability: Not on file    Transportation needs     Medical: Not on file     Non-medical: Not on file   Tobacco Use    Smoking status: Former Smoker     Packs/day: 0.25     Years: 5.00     Pack years: 1.25     Last attempt to quit: 1998     Years since quittin.0    Smokeless tobacco: Never Used   Substance and Sexual Activity    Alcohol use:  Yes     Alcohol/week: 7.0 standard drinks     Types: 7 Glasses of wine per week    Drug use: No    Sexual activity: Yes     Partners: Male   Lifestyle    Physical activity     Days per week: Not on file     Minutes per session: Not on file    Stress: Not on file   Relationships    Social connections     Talks Interpretation:  sinus tachycardia, there are no previous tracings available for comparison. Lab Review not applicable        Assessment:       63 y.o. patient with planned surgery as above. Known risk factors for perioperative complications: None  Current medications which may produce withdrawal symptoms if withheld perioperatively: none      Plan:     1. Preoperative workup as follows: electrolytes, creatinine, glucose, liver function studies  2. Change in medication regimen before surgery: Hold aspirin, ibuprofen, aleve, naprosyn, other NSAIDS, or products containing these medications for 7 days before surgery. Hold fish oil, and vitamin E  OK to take multiple vitamin  OK to take tylenol  Nothing to eat of drink after midnight before surgery. 3. Prophylaxis for cardiac events with perioperative beta-blockers: Not indicated  ACC/AHA indications for pre-operative beta-blocker use:    · Vascular surgery with history of postitive stress test  · Intermediate or high risk surgery with history of CAD   · Intermediate or high risk surgery with multiple clinical predictors of CAD- 2 of the following: history of compensated or prior heart failure, history of cerebrovascular disease, DM, or renal insufficiency    Routine administration of higher-dose, long-acting metoprolol in beta-blocker-naïve patients on the day of surgery, and in the absence of dose titration is associated with an overall increase in mortality. Beta-blockers should be started days to weeks prior to surgery and titrated to pulse < 70.  4. Deep vein thrombosis prophylaxis: regimen to be chosen by surgical team  5.  No contraindications to planned surgery

## 2020-12-10 ENCOUNTER — OFFICE VISIT (OUTPATIENT)
Dept: PRIMARY CARE CLINIC | Age: 65
End: 2020-12-10

## 2020-12-10 NOTE — PATIENT INSTRUCTIONS

## 2020-12-11 ENCOUNTER — TELEPHONE (OUTPATIENT)
Dept: ORTHOPEDIC SURGERY | Age: 65
End: 2020-12-11

## 2020-12-12 LAB — SARS-COV-2, NAA: NOT DETECTED

## 2020-12-14 ENCOUNTER — ANESTHESIA EVENT (OUTPATIENT)
Dept: OPERATING ROOM | Age: 65
End: 2020-12-14
Payer: COMMERCIAL

## 2020-12-15 ENCOUNTER — HOSPITAL ENCOUNTER (OUTPATIENT)
Age: 65
Setting detail: OUTPATIENT SURGERY
Discharge: HOME | End: 2020-12-15
Attending: ORTHOPAEDIC SURGERY | Admitting: ORTHOPAEDIC SURGERY
Payer: COMMERCIAL

## 2020-12-15 ENCOUNTER — ANESTHESIA (OUTPATIENT)
Dept: OPERATING ROOM | Age: 65
End: 2020-12-15
Payer: COMMERCIAL

## 2020-12-15 VITALS
OXYGEN SATURATION: 99 % | SYSTOLIC BLOOD PRESSURE: 127 MMHG | RESPIRATION RATE: 2 BRPM | DIASTOLIC BLOOD PRESSURE: 71 MMHG

## 2020-12-15 VITALS
BODY MASS INDEX: 27.6 KG/M2 | OXYGEN SATURATION: 98 % | TEMPERATURE: 97.1 F | SYSTOLIC BLOOD PRESSURE: 136 MMHG | WEIGHT: 150 LBS | RESPIRATION RATE: 13 BRPM | DIASTOLIC BLOOD PRESSURE: 81 MMHG | HEIGHT: 62 IN | HEART RATE: 79 BPM

## 2020-12-15 DIAGNOSIS — Z98.890 S/P ARTHROSCOPY OF SHOULDER: Primary | ICD-10-CM

## 2020-12-15 PROCEDURE — 6360000002 HC RX W HCPCS: Performed by: ORTHOPAEDIC SURGERY

## 2020-12-15 PROCEDURE — 3700000001 HC ADD 15 MINUTES (ANESTHESIA): Performed by: ORTHOPAEDIC SURGERY

## 2020-12-15 PROCEDURE — C1713 ANCHOR/SCREW BN/BN,TIS/BN: HCPCS | Performed by: ORTHOPAEDIC SURGERY

## 2020-12-15 PROCEDURE — C9290 INJ, BUPIVACAINE LIPOSOME: HCPCS | Performed by: ORTHOPAEDIC SURGERY

## 2020-12-15 PROCEDURE — 6360000002 HC RX W HCPCS: Performed by: ANESTHESIOLOGY

## 2020-12-15 PROCEDURE — 2500000003 HC RX 250 WO HCPCS: Performed by: NURSE ANESTHETIST, CERTIFIED REGISTERED

## 2020-12-15 PROCEDURE — 7100000010 HC PHASE II RECOVERY - FIRST 15 MIN: Performed by: ORTHOPAEDIC SURGERY

## 2020-12-15 PROCEDURE — 2580000003 HC RX 258: Performed by: ANESTHESIOLOGY

## 2020-12-15 PROCEDURE — 6360000002 HC RX W HCPCS: Performed by: NURSE ANESTHETIST, CERTIFIED REGISTERED

## 2020-12-15 PROCEDURE — 3700000000 HC ANESTHESIA ATTENDED CARE: Performed by: ORTHOPAEDIC SURGERY

## 2020-12-15 PROCEDURE — 2709999900 HC NON-CHARGEABLE SUPPLY: Performed by: ORTHOPAEDIC SURGERY

## 2020-12-15 PROCEDURE — 6370000000 HC RX 637 (ALT 250 FOR IP): Performed by: ANESTHESIOLOGY

## 2020-12-15 PROCEDURE — 3600000014 HC SURGERY LEVEL 4 ADDTL 15MIN: Performed by: ORTHOPAEDIC SURGERY

## 2020-12-15 PROCEDURE — 7100000000 HC PACU RECOVERY - FIRST 15 MIN: Performed by: ORTHOPAEDIC SURGERY

## 2020-12-15 PROCEDURE — 2500000003 HC RX 250 WO HCPCS: Performed by: ORTHOPAEDIC SURGERY

## 2020-12-15 PROCEDURE — 3600000004 HC SURGERY LEVEL 4 BASE: Performed by: ORTHOPAEDIC SURGERY

## 2020-12-15 PROCEDURE — 7100000001 HC PACU RECOVERY - ADDTL 15 MIN: Performed by: ORTHOPAEDIC SURGERY

## 2020-12-15 PROCEDURE — 2720000010 HC SURG SUPPLY STERILE: Performed by: ORTHOPAEDIC SURGERY

## 2020-12-15 PROCEDURE — 2580000003 HC RX 258: Performed by: ORTHOPAEDIC SURGERY

## 2020-12-15 PROCEDURE — 7100000011 HC PHASE II RECOVERY - ADDTL 15 MIN: Performed by: ORTHOPAEDIC SURGERY

## 2020-12-15 DEVICE — HEALICOIL RG SA 5.5MM W/2 UB-BL                                    CBRD BL
Type: IMPLANTABLE DEVICE | Site: HUMERUS | Status: FUNCTIONAL
Brand: HEALICOIL / ULTRABRAID

## 2020-12-15 DEVICE — MULTIFIX S-ULTRA 5.5MM KNOTLESS ANCHOR
Type: IMPLANTABLE DEVICE | Site: HUMERUS | Status: FUNCTIONAL
Brand: MULTIFIX

## 2020-12-15 RX ORDER — DEXAMETHASONE SODIUM PHOSPHATE 10 MG/ML
INJECTION INTRAMUSCULAR; INTRAVENOUS PRN
Status: DISCONTINUED | OUTPATIENT
Start: 2020-12-15 | End: 2020-12-15 | Stop reason: SDUPTHER

## 2020-12-15 RX ORDER — FENTANYL CITRATE 50 UG/ML
INJECTION, SOLUTION INTRAMUSCULAR; INTRAVENOUS PRN
Status: DISCONTINUED | OUTPATIENT
Start: 2020-12-15 | End: 2020-12-15 | Stop reason: SDUPTHER

## 2020-12-15 RX ORDER — LABETALOL HYDROCHLORIDE 5 MG/ML
INJECTION, SOLUTION INTRAVENOUS PRN
Status: DISCONTINUED | OUTPATIENT
Start: 2020-12-15 | End: 2020-12-15 | Stop reason: SDUPTHER

## 2020-12-15 RX ORDER — MEPERIDINE HYDROCHLORIDE 50 MG/ML
12.5 INJECTION INTRAMUSCULAR; INTRAVENOUS; SUBCUTANEOUS EVERY 5 MIN PRN
Status: DISCONTINUED | OUTPATIENT
Start: 2020-12-15 | End: 2020-12-15 | Stop reason: HOSPADM

## 2020-12-15 RX ORDER — SODIUM CHLORIDE, SODIUM LACTATE, POTASSIUM CHLORIDE, CALCIUM CHLORIDE 600; 310; 30; 20 MG/100ML; MG/100ML; MG/100ML; MG/100ML
INJECTION, SOLUTION INTRAVENOUS CONTINUOUS
Status: DISCONTINUED | OUTPATIENT
Start: 2020-12-15 | End: 2020-12-15 | Stop reason: HOSPADM

## 2020-12-15 RX ORDER — SODIUM CHLORIDE 0.9 % (FLUSH) 0.9 %
10 SYRINGE (ML) INJECTION EVERY 12 HOURS SCHEDULED
Status: DISCONTINUED | OUTPATIENT
Start: 2020-12-15 | End: 2020-12-15 | Stop reason: HOSPADM

## 2020-12-15 RX ORDER — PROMETHAZINE HYDROCHLORIDE 25 MG/ML
6.25 INJECTION, SOLUTION INTRAMUSCULAR; INTRAVENOUS
Status: DISCONTINUED | OUTPATIENT
Start: 2020-12-15 | End: 2020-12-15 | Stop reason: HOSPADM

## 2020-12-15 RX ORDER — ONDANSETRON 2 MG/ML
INJECTION INTRAMUSCULAR; INTRAVENOUS PRN
Status: DISCONTINUED | OUTPATIENT
Start: 2020-12-15 | End: 2020-12-15 | Stop reason: SDUPTHER

## 2020-12-15 RX ORDER — MORPHINE SULFATE 2 MG/ML
2 INJECTION, SOLUTION INTRAMUSCULAR; INTRAVENOUS EVERY 5 MIN PRN
Status: DISCONTINUED | OUTPATIENT
Start: 2020-12-15 | End: 2020-12-15 | Stop reason: HOSPADM

## 2020-12-15 RX ORDER — MIDAZOLAM HYDROCHLORIDE 1 MG/ML
INJECTION INTRAMUSCULAR; INTRAVENOUS
Status: COMPLETED
Start: 2020-12-15 | End: 2020-12-15

## 2020-12-15 RX ORDER — ROCURONIUM BROMIDE 10 MG/ML
INJECTION, SOLUTION INTRAVENOUS PRN
Status: DISCONTINUED | OUTPATIENT
Start: 2020-12-15 | End: 2020-12-15 | Stop reason: SDUPTHER

## 2020-12-15 RX ORDER — MORPHINE SULFATE 2 MG/ML
1 INJECTION, SOLUTION INTRAMUSCULAR; INTRAVENOUS EVERY 5 MIN PRN
Status: DISCONTINUED | OUTPATIENT
Start: 2020-12-15 | End: 2020-12-15 | Stop reason: HOSPADM

## 2020-12-15 RX ORDER — LABETALOL HYDROCHLORIDE 5 MG/ML
5 INJECTION, SOLUTION INTRAVENOUS EVERY 10 MIN PRN
Status: DISCONTINUED | OUTPATIENT
Start: 2020-12-15 | End: 2020-12-15 | Stop reason: HOSPADM

## 2020-12-15 RX ORDER — BUPIVACAINE HYDROCHLORIDE 2.5 MG/ML
INJECTION, SOLUTION INFILTRATION; PERINEURAL PRN
Status: DISCONTINUED | OUTPATIENT
Start: 2020-12-15 | End: 2020-12-15 | Stop reason: ALTCHOICE

## 2020-12-15 RX ORDER — OXYCODONE HYDROCHLORIDE AND ACETAMINOPHEN 5; 325 MG/1; MG/1
1 TABLET ORAL PRN
Status: COMPLETED | OUTPATIENT
Start: 2020-12-15 | End: 2020-12-15

## 2020-12-15 RX ORDER — LIDOCAINE HYDROCHLORIDE 10 MG/ML
0.3 INJECTION, SOLUTION EPIDURAL; INFILTRATION; INTRACAUDAL; PERINEURAL
Status: DISCONTINUED | OUTPATIENT
Start: 2020-12-15 | End: 2020-12-15 | Stop reason: HOSPADM

## 2020-12-15 RX ORDER — SODIUM CHLORIDE 0.9 % (FLUSH) 0.9 %
10 SYRINGE (ML) INJECTION PRN
Status: DISCONTINUED | OUTPATIENT
Start: 2020-12-15 | End: 2020-12-15 | Stop reason: HOSPADM

## 2020-12-15 RX ORDER — OXYCODONE HYDROCHLORIDE AND ACETAMINOPHEN 5; 325 MG/1; MG/1
2 TABLET ORAL PRN
Status: COMPLETED | OUTPATIENT
Start: 2020-12-15 | End: 2020-12-15

## 2020-12-15 RX ORDER — MIDAZOLAM HYDROCHLORIDE 1 MG/ML
2 INJECTION INTRAMUSCULAR; INTRAVENOUS ONCE
Status: COMPLETED | OUTPATIENT
Start: 2020-12-15 | End: 2020-12-15

## 2020-12-15 RX ORDER — HYDRALAZINE HYDROCHLORIDE 20 MG/ML
5 INJECTION INTRAMUSCULAR; INTRAVENOUS EVERY 10 MIN PRN
Status: DISCONTINUED | OUTPATIENT
Start: 2020-12-15 | End: 2020-12-15 | Stop reason: HOSPADM

## 2020-12-15 RX ORDER — GABAPENTIN 300 MG/1
300 CAPSULE ORAL ONCE
Status: COMPLETED | OUTPATIENT
Start: 2020-12-15 | End: 2020-12-15

## 2020-12-15 RX ORDER — PROPOFOL 10 MG/ML
INJECTION, EMULSION INTRAVENOUS PRN
Status: DISCONTINUED | OUTPATIENT
Start: 2020-12-15 | End: 2020-12-15 | Stop reason: SDUPTHER

## 2020-12-15 RX ORDER — DIPHENHYDRAMINE HYDROCHLORIDE 50 MG/ML
12.5 INJECTION INTRAMUSCULAR; INTRAVENOUS
Status: DISCONTINUED | OUTPATIENT
Start: 2020-12-15 | End: 2020-12-15 | Stop reason: HOSPADM

## 2020-12-15 RX ORDER — OXYCODONE HYDROCHLORIDE AND ACETAMINOPHEN 5; 325 MG/1; MG/1
1 TABLET ORAL EVERY 4 HOURS PRN
Qty: 40 TABLET | Refills: 0 | Status: SHIPPED | OUTPATIENT
Start: 2020-12-15 | End: 2020-12-22

## 2020-12-15 RX ORDER — ONDANSETRON 2 MG/ML
4 INJECTION INTRAMUSCULAR; INTRAVENOUS PRN
Status: DISCONTINUED | OUTPATIENT
Start: 2020-12-15 | End: 2020-12-15 | Stop reason: HOSPADM

## 2020-12-15 RX ORDER — ACETAMINOPHEN 500 MG
1000 TABLET ORAL ONCE
Status: COMPLETED | OUTPATIENT
Start: 2020-12-15 | End: 2020-12-15

## 2020-12-15 RX ORDER — CELECOXIB 100 MG/1
200 CAPSULE ORAL ONCE
Status: COMPLETED | OUTPATIENT
Start: 2020-12-15 | End: 2020-12-15

## 2020-12-15 RX ORDER — MIDAZOLAM HYDROCHLORIDE 1 MG/ML
INJECTION INTRAMUSCULAR; INTRAVENOUS PRN
Status: DISCONTINUED | OUTPATIENT
Start: 2020-12-15 | End: 2020-12-15 | Stop reason: SDUPTHER

## 2020-12-15 RX ADMIN — GABAPENTIN 300 MG: 300 CAPSULE ORAL at 10:07

## 2020-12-15 RX ADMIN — HYDROMORPHONE HYDROCHLORIDE 0.5 MG: 1 INJECTION, SOLUTION INTRAMUSCULAR; INTRAVENOUS; SUBCUTANEOUS at 13:25

## 2020-12-15 RX ADMIN — CEFAZOLIN SODIUM 2 G: 10 INJECTION, POWDER, FOR SOLUTION INTRAVENOUS at 11:37

## 2020-12-15 RX ADMIN — FENTANYL CITRATE 100 MCG: 50 INJECTION INTRAMUSCULAR; INTRAVENOUS at 11:37

## 2020-12-15 RX ADMIN — LABETALOL HYDROCHLORIDE 2.5 MG: 5 INJECTION, SOLUTION INTRAVENOUS at 11:55

## 2020-12-15 RX ADMIN — MIDAZOLAM HYDROCHLORIDE 2 MG: 2 INJECTION, SOLUTION INTRAMUSCULAR; INTRAVENOUS at 11:37

## 2020-12-15 RX ADMIN — HYDROMORPHONE HYDROCHLORIDE 0.5 MG: 1 INJECTION, SOLUTION INTRAMUSCULAR; INTRAVENOUS; SUBCUTANEOUS at 13:15

## 2020-12-15 RX ADMIN — DEXAMETHASONE SODIUM PHOSPHATE 10 MG: 10 INJECTION INTRAMUSCULAR; INTRAVENOUS at 11:58

## 2020-12-15 RX ADMIN — ACETAMINOPHEN 1000 MG: 500 TABLET ORAL at 10:05

## 2020-12-15 RX ADMIN — LABETALOL HYDROCHLORIDE 2.5 MG: 5 INJECTION, SOLUTION INTRAVENOUS at 11:57

## 2020-12-15 RX ADMIN — OXYCODONE HYDROCHLORIDE AND ACETAMINOPHEN 1 TABLET: 5; 325 TABLET ORAL at 13:53

## 2020-12-15 RX ADMIN — MIDAZOLAM 2 MG: 1 INJECTION INTRAMUSCULAR; INTRAVENOUS at 10:52

## 2020-12-15 RX ADMIN — CELECOXIB 200 MG: 100 CAPSULE ORAL at 10:06

## 2020-12-15 RX ADMIN — PROPOFOL 200 MG: 10 INJECTION, EMULSION INTRAVENOUS at 11:37

## 2020-12-15 RX ADMIN — SUGAMMADEX 200 MG: 100 INJECTION, SOLUTION INTRAVENOUS at 12:46

## 2020-12-15 RX ADMIN — SODIUM CHLORIDE, POTASSIUM CHLORIDE, SODIUM LACTATE AND CALCIUM CHLORIDE: 600; 310; 30; 20 INJECTION, SOLUTION INTRAVENOUS at 13:19

## 2020-12-15 RX ADMIN — ONDANSETRON 4 MG: 2 INJECTION INTRAMUSCULAR; INTRAVENOUS at 11:57

## 2020-12-15 RX ADMIN — ROCURONIUM BROMIDE 50 MG: 10 SOLUTION INTRAVENOUS at 11:37

## 2020-12-15 RX ADMIN — HYDROMORPHONE HYDROCHLORIDE 0.5 MG: 1 INJECTION, SOLUTION INTRAMUSCULAR; INTRAVENOUS; SUBCUTANEOUS at 13:06

## 2020-12-15 RX ADMIN — SODIUM CHLORIDE, POTASSIUM CHLORIDE, SODIUM LACTATE AND CALCIUM CHLORIDE: 600; 310; 30; 20 INJECTION, SOLUTION INTRAVENOUS at 10:07

## 2020-12-15 ASSESSMENT — PULMONARY FUNCTION TESTS
PIF_VALUE: 3
PIF_VALUE: 21
PIF_VALUE: 19
PIF_VALUE: 18
PIF_VALUE: 1
PIF_VALUE: 20
PIF_VALUE: 21
PIF_VALUE: 18
PIF_VALUE: 19
PIF_VALUE: 19
PIF_VALUE: 20
PIF_VALUE: 19
PIF_VALUE: 21
PIF_VALUE: 18
PIF_VALUE: 18
PIF_VALUE: 21
PIF_VALUE: 20
PIF_VALUE: 20
PIF_VALUE: 7
PIF_VALUE: 21
PIF_VALUE: 20
PIF_VALUE: 21
PIF_VALUE: 19
PIF_VALUE: 6
PIF_VALUE: 19
PIF_VALUE: 19
PIF_VALUE: 18
PIF_VALUE: 2
PIF_VALUE: 19
PIF_VALUE: 20
PIF_VALUE: 21
PIF_VALUE: 20
PIF_VALUE: 20
PIF_VALUE: 18
PIF_VALUE: 21
PIF_VALUE: 19
PIF_VALUE: 28
PIF_VALUE: 18
PIF_VALUE: 20
PIF_VALUE: 19
PIF_VALUE: 20
PIF_VALUE: 18
PIF_VALUE: 2
PIF_VALUE: 19
PIF_VALUE: 21
PIF_VALUE: 19
PIF_VALUE: 20
PIF_VALUE: 19
PIF_VALUE: 18
PIF_VALUE: 19
PIF_VALUE: 20
PIF_VALUE: 18
PIF_VALUE: 20
PIF_VALUE: 18
PIF_VALUE: 21
PIF_VALUE: 18
PIF_VALUE: 5
PIF_VALUE: 19
PIF_VALUE: 20
PIF_VALUE: 2
PIF_VALUE: 1
PIF_VALUE: 20
PIF_VALUE: 19
PIF_VALUE: 19
PIF_VALUE: 20
PIF_VALUE: 5
PIF_VALUE: 2
PIF_VALUE: 2
PIF_VALUE: 19

## 2020-12-15 ASSESSMENT — PAIN SCALES - GENERAL
PAINLEVEL_OUTOF10: 0
PAINLEVEL_OUTOF10: 7
PAINLEVEL_OUTOF10: 7
PAINLEVEL_OUTOF10: 8
PAINLEVEL_OUTOF10: 8
PAINLEVEL_OUTOF10: 0

## 2020-12-15 ASSESSMENT — PAIN - FUNCTIONAL ASSESSMENT: PAIN_FUNCTIONAL_ASSESSMENT: 0-10

## 2020-12-15 NOTE — PROGRESS NOTES
Preoperative Screening for Elective Surgery/Invasive Procedures While COVID-19 present in the community    ? Have you tested positive or have been told to self-isolate for COVID-19 like symptoms within the past 28 days? No  ? Do you currently have any of the following symptoms? No  o Fever >100.0 F or 99.9 F in immunocompromised patients? No  o New onset cough, shortness of breath or difficulty breathing? No  o New onset sore throat, myalgia (muscle aches and pains), headache, loss of taste/smell or diarrhea? No  ? Have you had a potential exposure to COVID-19 within the past 14 days by:  o Close contact with a confirmed case? No  o Close contact with a healthcare worker,  or essential infrastructure worker (grocery store, [employer], gas station, public utilities or transportation)? No  o Do you reside in a congregate setting such as; skilled nursing facility, adult home, correctional facility, homeless shelter or other institutional setting? No  o Have you had recent travel to a known COVID-19 hotspot? No    Indicate if the patient has a positive screen by answering yes to one or more of the above questions. Patients who test positive or screen positive prior to surgery or on the day of surgery should be evaluated in conjunction with the surgeon/proceduralist/anesthesiologist to determine the urgency of the procedure.   Me

## 2020-12-15 NOTE — ANESTHESIA PRE PROCEDURE
Department of Anesthesiology  Preprocedure Note       Name:  Karime Link   Age:  63 y.o.  :  1955                                          MRN:  [id number]         Date:  12/15/2020      Surgeon: Jolene Rome):  Kalie Farnsworth MD    Procedure: Procedure(s):  EXAM UNDER ANESTHESIA VIDEO ARTHROSCOPY LEFT SHOULDER, MINI-OPEN ROTATOR CUFF REPAIR, NEER ACROMIOPLASTY AND KALEIGH PROCEDURE WITH EXPAREL    Medications prior to admission:   Prior to Admission medications    Medication Sig Start Date End Date Taking? Authorizing Provider   ACZONE 7.5 % GEL MABLE EXT TO FACE QD 20  Yes Historical Provider, MD   Apoaequorin (PREVAGEN PO) Take by mouth   Yes Historical Provider, MD   GLUCOSAMINE-CHONDROITIN PO Take by mouth   Yes Historical Provider, MD   MAGNESIUM PO Take by mouth   Yes Historical Provider, MD   Cyanocobalamin (VITAMIN B-12) 1000 MCG CR tablet Take 1,000 mcg by mouth daily   Yes Historical Provider, MD   Cholecalciferol (VITAMIN D3) 2000 UNITS CAPS Take 1 capsule by mouth daily   Yes Historical Provider, MD   calcium carbonate (OSCAL) 500 MG TABS tablet Take 500 mg by mouth daily   Yes Historical Provider, MD   Lactobacillus (PROBIOTIC ACIDOPHILUS PO) Take 1 capsule by mouth daily   Yes Historical Provider, MD   spironolactone (ALDACTONE) 100 MG tablet Take 1.5 tablets by mouth daily. Takes one and a half daily FOR ACNE  Patient taking differently:   Take 200 mg by mouth daily Takes one and a half daily FOR ACNE 13  Yes Dixon Michaud MD   Tretinoin, Facial Wrinkles, (TRETINOIN, EMOLLIENT,) 0.05 % CREA Apply  topically.  Face  Indications: for acne   Yes Historical Provider, MD       Current medications:    Current Facility-Administered Medications   Medication Dose Route Frequency Provider Last Rate Last Admin    ceFAZolin (ANCEF) 2 g in dextrose 5 % 100 mL IVPB  2 g Intravenous On Call to Roena Ormond, MD  lactated ringers infusion   Intravenous Continuous Larry Cruz  mL/hr at 12/15/20 1007 New Bag at 12/15/20 1007    sodium chloride flush 0.9 % injection 10 mL  10 mL Intravenous 2 times per day Larry Cruz MD        sodium chloride flush 0.9 % injection 10 mL  10 mL Intravenous PRN Larry Cruz MD        lidocaine PF 1 % injection 0.3 mL  0.3 mL Intradermal Once PRN Larry Cruz MD           Allergies:  No Known Allergies    Problem List:    Patient Active Problem List   Diagnosis Code    Acne L70.9    Tinnitus H93.19    Insomnia G47.00       Past Medical History:        Diagnosis Date    Acne     Uterine fibroids affecting pregnancy 10/11       Past Surgical History:        Procedure Laterality Date     SECTION      X4    COLONOSCOPY  03    WNL    DENTAL SURGERY      as child    DILATION AND CURETTAGE OF UTERUS      missed ab    HYSTERECTOMY, TOTAL ABDOMINAL  11/15/2011       Social History:    Social History     Tobacco Use    Smoking status: Former Smoker     Packs/day: 0.25     Years: 5.00     Pack years: 1.25     Quit date: 1998     Years since quittin.1    Smokeless tobacco: Never Used   Substance Use Topics    Alcohol use:  Yes     Alcohol/week: 7.0 standard drinks     Types: 7 Glasses of wine per week                                Counseling given: Not Answered      Vital Signs (Current):   Vitals:    20 1612 12/15/20 0953 12/15/20 1039   BP:  (!) 152/75    Pulse:  116 100   Resp:  20    Temp:  98.4 °F (36.9 °C)    TempSrc:  Temporal    SpO2:  99% 96%   Weight: 150 lb (68 kg) 150 lb (68 kg)    Height: 5' 2\" (1.575 m) 5' 2\" (1.575 m)                                               BP Readings from Last 3 Encounters:   12/15/20 (!) 152/75   20 (!) 140/68   20 134/78       NPO Status: Time of last liquid consumption: 2300                        Time of last solid consumption: 1800 Date of last liquid consumption: 12/14/20                        Date of last solid food consumption: 12/14/20    BMI:   Wt Readings from Last 3 Encounters:   12/15/20 150 lb (68 kg)   12/09/20 152 lb (68.9 kg)   11/27/20 154 lb (69.9 kg)     Body mass index is 27.44 kg/m². CBC:   Lab Results   Component Value Date    WBC 8.1 11/11/2011    RBC 4.51 11/11/2011    HGB 12.3 11/16/2011    HCT 42.6 11/11/2011    MCV 94.5 11/11/2011    RDW 12.8 11/11/2011     11/11/2011       CMP:   Lab Results   Component Value Date     12/09/2020    K 4.5 12/09/2020     12/09/2020    CO2 28 12/09/2020    BUN 13 12/09/2020    CREATININE 1.0 12/09/2020    GFRAA >60 12/09/2020    GFRAA >60 11/11/2011    AGRATIO 1.6 12/09/2020    LABGLOM 56 12/09/2020    GLUCOSE 107 12/09/2020    PROT 7.4 12/09/2020    PROT 7.2 08/06/2010    CALCIUM 9.8 12/09/2020    BILITOT 0.4 12/09/2020    ALKPHOS 60 12/09/2020    AST 18 12/09/2020    ALT 18 12/09/2020       POC Tests: No results for input(s): POCGLU, POCNA, POCK, POCCL, POCBUN, POCHEMO, POCHCT in the last 72 hours.     Coags: No results found for: PROTIME, INR, APTT    HCG (If Applicable): No results found for: PREGTESTUR, PREGSERUM, HCG, HCGQUANT     ABGs: No results found for: PHART, PO2ART, OZO4QGN, YRV6ITG, BEART, B0YDTFKU     Type & Screen (If Applicable):  Lab Results   Component Value Date    LABABO A 11/11/2011    [address] Negative 11/11/2011       Drug/Infectious Status (If Applicable):  No results found for: HIV, HEPCAB    COVID-19 Screening (If Applicable):   Lab Results   Component Value Date    COVID19 NOT DETECTED 12/10/2020         Anesthesia Evaluation  Patient summary reviewed no history of anesthetic complications:   Airway: Mallampati: II  TM distance: >3 FB   Neck ROM: full  Mouth opening: > = 3 FB Dental: normal exam         Pulmonary:Negative Pulmonary ROS and normal exam  breath sounds clear to auscultation Cardiovascular:Negative CV ROS  Exercise tolerance: good (>4 METS),           Rhythm: regular  Rate: normal                 ROS comment: ST on EKG     Neuro/Psych:   Negative Neuro/Psych ROS              GI/Hepatic/Renal: Neg GI/Hepatic/Renal ROS  (+) GERD: well controlled,           Endo/Other: Negative Endo/Other ROS                    Abdominal:           Vascular: negative vascular ROS. Pre-Operative Diagnosis: Traumatic tear of left rotator cuff, initial encounter [S46.012A]; Arthritis of left shoulder region [M19.012]    63 y.o.   BMI:  Body mass index is 27.44 kg/m². Vitals:    20 1612 12/15/20 0953 12/15/20 1039   BP:  (!) 152/75    Pulse:  116 100   Resp:  20    Temp:  98.4 °F (36.9 °C)    TempSrc:  Temporal    SpO2:  99% 96%   Weight: 150 lb (68 kg) 150 lb (68 kg)    Height: 5' 2\" (1.575 m) 5' 2\" (1.575 m)        No Known Allergies    Social History     Tobacco Use    Smoking status: Former Smoker     Packs/day: 0.25     Years: 5.00     Pack years: 1.25     Quit date: 1998     Years since quittin.1    Smokeless tobacco: Never Used   Substance Use Topics    Alcohol use: Yes     Alcohol/week: 7.0 standard drinks     Types: 7 Glasses of wine per week       LABS:    CBC  Lab Results   Component Value Date/Time    WBC 8.1 2011 03:45 PM    HGB 12.3 2011 06:00 AM    HCT 42.6 2011 03:45 PM     2011 03:45 PM     RENAL  Lab Results   Component Value Date/Time     2020 10:25 AM    K 4.5 2020 10:25 AM     2020 10:25 AM    CO2 28 2020 10:25 AM    BUN 13 2020 10:25 AM    CREATININE 1.0 2020 10:25 AM    GLUCOSE 107 (H) 2020 10:25 AM     COAGS  No results found for: PROTIME, INR, APTT     2020 EKG  Sinus  Tachycardia   -Old anterior infarct.      ABNORMAL      Anesthesia Plan      general     ASA 2 (I discussed with the patient the risks and benefits of PIV, anesthesia, IV Narcotics, PACU. All questions were answered the patient agrees with the plan and wishes to proceed)  Induction: intravenous.                           Melanie Fields MD   12/15/2020

## 2020-12-15 NOTE — ANESTHESIA POSTPROCEDURE EVALUATION
Department of Anesthesiology  Postprocedure Note    Patient: Mago Mccarthy  MRN: [id number]  YOB: 1955  Date of evaluation: 12/15/2020  Time:  4:17 PM     Procedure Summary     Date: 12/15/20 Room / Location: [facility]    Anesthesia Start: 9614 Anesthesia Stop: 1253    Procedure: EXAM UNDER ANESTHESIA VIDEO ARTHROSCOPY LEFT SHOULDER, MINI-OPEN ROTATOR CUFF REPAIR, NEER ACROMIOPLASTY AND KALEIGH PROCEDURE WITH EXPAREL (Left Shoulder) Diagnosis:       Traumatic tear of left rotator cuff, initial encounter      Arthritis of left shoulder region      (ROTATOR CUFF TEAR, AC JOINT ARTHRITIS LEFT SHOULDER)    Surgeons: Rakesh Sheth MD Responsible Provider: Asael Rosenthal MD    Anesthesia Type: general ASA Status: 2          Anesthesia Type: general    Felicita Phase I: Felicita Score: 9    Felicita Phase II: Felicita Score: 10    Last vitals: Reviewed and per EMR flowsheets.      Vitals:    12/15/20 1315 12/15/20 1330 12/15/20 1345 12/15/20 1410   BP: (!) 151/75 131/61 136/81    Pulse: 81 79 78 79   Resp: 12 15 13    Temp:       TempSrc:       SpO2: 98% 95% 98%    Weight:       Height:         Anesthesia Post Evaluation    Patient location during evaluation: bedside  Patient participation: complete - patient participated  Level of consciousness: awake and alert  Airway patency: patent  Nausea & Vomiting: no nausea  Complications: no  Cardiovascular status: hemodynamically stable  Respiratory status: acceptable  Hydration status: euvolemic

## 2020-12-15 NOTE — H&P
I have reviewed the history and physical and examined the patient and find no relevant changes. I have reviewed with the patient and/or family the risks, benefits, and alternatives to the procedure. Patient being given increased dosage/quantity of opoid pain medication in excess of OSMB guidelines which noted a 30 MED daily of opioids due to the fact that he/she has undergone major orthopaedic surgery as outlined in rule 4731-11-13. Dosages and further duration of the pain medication will be re-evaluated at her post op visit in 2 weeks. Patient was educated on dosing expectations and limits of prescribing as a result of the new [facility] rules enacted August 31, 2017. Please also note that this is not the initial opoid prescription issued to this patient but a continuation of medication utilized during the hospital admission as noted in the medical record. OARRS report has also been utilized to screen for any abuse history or suspicious activity as outlined in [place]. All efforts have been taken to prevent abuse potential and misuse of opioid medications including education, screening, and close clinical follow up. Controlled Substance Monitoring:    Acute and Chronic Pain Monitoring:   RX Monitoring 12/15/2020   Periodic Controlled Substance Monitoring No signs of potential drug abuse or diversion identified.

## 2020-12-15 NOTE — BRIEF OP NOTE
Brief Postoperative Note      Patient: Janice Corrigan  YOB: 1955  MRN: [id number]    Date of Procedure: 12/15/2020    Pre-Op Diagnosis: ROTATOR CUFF TEAR, AC JOINT ARTHRITIS LEFT SHOULDER    Post-Op Diagnosis: Same       Procedure(s):  EXAM UNDER ANESTHESIA VIDEO ARTHROSCOPY LEFT SHOULDER, MINI-OPEN ROTATOR CUFF REPAIR, NEER ACROMIOPLASTY AND KALEIGH PROCEDURE WITH EXPAREL    Surgeon(s):  Evelia Mcgrath MD    Assistant:  Surgical Assistant: Kvng Batista  Physician Assistant: JIAN Dyer    Anesthesia: General    Estimated Blood Loss (mL): less than 50     Complications: None    Specimens:   * No specimens in log *    Implants:  Implant Name Type Inv. Item Serial No.  Lot No. LRB No. Used Action   ANCHOR SUT SZ 2-0 ULTRABRAID OPAL COBRAID SUT PRELD  ANCHOR SUT SZ 2-0 ULTRABRAID OPAL COBRAID SUT PRELD  [employer] [place] [id number] Left 2 Implanted   ANCHOR  [address] 5MM KNOTLESS MULTIFIX S-ULTRA  ANCHOR SUT DIA5. 5MM KNOTLESS [employer] [id number] Left 2 Implanted         Drains: * No LDAs found *    Findings: RTC tear    Electronically signed by JIAN White on 12/15/2020 at 12:52 PM

## 2020-12-16 NOTE — OP NOTE
[address] [address]                                OPERATIVE REPORT    PATIENT NAME: Prince Abdi                   :        1955  MED REC NO:   [id number]                          ROOM:  ACCOUNT NO:   [de-identified]                           ADMIT DATE: 12/15/2020  PROVIDER:     Sebastien Jewell MD    DATE OF PROCEDURE:  12/15/2020    SERVICE:  Orthopedic Surgery. SURGEON:  Laina Camarillo MD    FIRST ASSISTANT:  JIAN Montesinos    SECOND ASSISTANT:  Swathi Patel SA    PREOPERATIVE DIAGNOSES:  Complete tear of supraspinatus tendon rotator  cuff with AC joint arthritis, lateral arch stenosis and frayed anterior  and superior glenoid labrum. POSTOPERATIVE DIAGNOSES:  1. Complete tear of supraspinatus and part of the infraspinatus  measuring approximately 2 cm in length, approximately 1 cm retraction. 2.  Chronic rotator cuff tendinitis with a type 2 acromion process  morphology. 3.  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 4.  Frayed superior and anterior glenoid labrum without instability  pattern. 5.  Posttraumatic adhesive capsulitis. OPERATION PERFORMED:  1. Examination under anesthesia and video arthroscopy, left shoulder. 2.  Mini-open rotator cuff repair using two [employer] HEALICOIL  anchors and two [employer] and [employer] MultiFix anchors. 3.  Arthroscopic Neer acromioplasty. 4.  Arthroscopic Arleth procedure. 5.  Debridement of the glenohumeral articulation, inferior surface of  the rotator cuff, frayed superior and anterior labrum. 6.  Manipulation under anesthesia. ANESTHESIA:  General.    COMPLICATIONS:  None. COUNTS:  Sponge counts correct. DISPOSITION:  To recovery room. ESTIMATED BLOOD LOSS:  30 mL. BLOCKS:  None.     POSITION:  Right lateral decubitus with Vac-Pac, superficial bony prominences were carefully padded in axillary roll. Nine pounds of  longitudinal traction on the [place]ci system with 30 degrees of abduction,  10 degrees of forward flexion. INDICATIONS FOR SURGERY:  The patient is a 47-year-old lady who actually  injured her shoulder a couple of months ago. She had a full-thickness  rotator cuff tear and delayed surgery until now. She developed some  adhesive capsulitis posttraumatic and she understood the risks, benefits  and potential complications of the operation as well as a normal  rehabilitative protocol for this type of rotator cuff surgery. She  realized she had a full-thickness tear and rotator cuff rehabilitation  is relatively delayed in this clinical setting. She also realized  concerns regarding infection, deep vein thrombosis, pulmonary embolism,  arthrofibrosis, delayed rehabilitation, anesthetic complications  including death, cardiopulmonary issues, etc.  I did veda her shoulder  in the preprocedure holding area. DETAILS OF SURGERY:  The patient was taken to the operating room and  placed on the operating table in a supine position. General anesthesia  was induced and maintained. The shoulder was examined. The patient had  significant adhesive capsulitis. I could only abduct her arm to about  100 degrees, I externally rotated to 20 degrees. With gentle  manipulation, I was able to take her all the way up in full range of  motion with 180 degrees of abduction and 90 degrees of external  rotation. There was palpable audible lysis of adhesions as this was  done. She also has lysis of adhesions going into internal rotation to  90 degrees as well. Extension and adduction were not affected. After this was completed, the patient's shoulder was prepped and draped  and she was positioned as described above. Th glenohumeral joint was  entered next.     The post manipulation hemarthrosis was evacuated and the glenohumeral joint itself was not particularly arthritic. There was some fraying in  the superior and anterior labrum which was debrided with a 4.0  full-radius resector. No Hill-Sachs lesion. The biceps and biceps  anchor were intact. The patient had a readily obvious full-thickness  rotator cuff tear. Subacromial space was entered. Methodical bursectomy was completed  exposing the undersurface of the acromion process on the superior  surface of the rotator cuff. She had fairly extensive adhesion  formation here as well given the chronicity of her injury. An arthroscopic Neer acromioplasty was performed removing about 7 mm of  bone anteriorly and beveling this back about 3.5 cm in this moderate  size acromion process to allow for an excellent anterior and  anterolateral decompression. Any bleeding encountered was controlled  with the WEREWOLF. The [facility] joint was also arthritic and there was medial arch stenosis. An  arthroscopic Arleth procedure was completed resecting approximately the  distal 1.5 cm of clavicle. The undersurface of the clavicle was  carefully beveled to allow for good medial arch decompression. The edges of the rotator cuff were then evaluated. The rotator cuff  mobilized nicely into its anatomic bed. No other pathology was demonstrated from this point, so the instruments  were removed. A small longitudinal incision was made splitting the  deltoid over the rotator cuff tear. The anatomic footprint of the  rotator cuff was then curetted and rongeured to a good quality bleeding  bed. Then, two [employer] and [employer] HEALICOIL anchors were placed. Multiple sutures were placed on the rotator cuff to advance it into its  anatomic bed. Good three-dimensional footprint was obtained with  syd-crossing the HEALICOIL anchors on the second row of anchors. I  was very happy with the overall positioning of the graft and minimal  tension was noted. The wounds were then copiously irrigated. Exparel was then injected  into the Arleth procedure, the acromioplasty procedure and the incision  area. Nothing was placed into the joint. After the wounds were closed,  the patient was placed in a shoulder immobilization device postprocedure  and went to the recovery room in stable condition.         David Goodman MD    D: 12/15/2020 12:46:53       T: 12/15/2020 16:21:23     [email or web address]  Job#: [id number]     Doc#: [id number]    CC:

## 2020-12-18 ENCOUNTER — TELEPHONE (OUTPATIENT)
Dept: ORTHOPEDIC SURGERY | Age: 65
End: 2020-12-18

## 2020-12-18 ENCOUNTER — OFFICE VISIT (OUTPATIENT)
Dept: ORTHOPEDIC SURGERY | Age: 65
End: 2020-12-18

## 2020-12-18 VITALS — WEIGHT: 150 LBS | BODY MASS INDEX: 27.6 KG/M2 | HEIGHT: 62 IN

## 2020-12-18 PROCEDURE — 99024 POSTOP FOLLOW-UP VISIT: CPT | Performed by: ORTHOPAEDIC SURGERY

## 2020-12-18 NOTE — PROGRESS NOTES
OPERATION PERFORMED:  1. Examination under anesthesia and video arthroscopy, left shoulder. 2.  Mini-open rotator cuff repair using two [employer] HEALICOIL  anchors and two [employer] and [employer] MultiFix anchors. 3.  Arthroscopic Neer acromioplasty. 4.  Arthroscopic Arleth procedure. 5.  Debridement of the glenohumeral articulation, inferior surface of  the rotator cuff, frayed superior and anterior labrum. 6.  Manipulation under anesthesia. Patient surgery was 12/15/2020. Overall, the patient is doing very well. She voices minimal complaints. We went over the details of the surgery and normal rehabilitation. It is look excellent. She can wiggle her fingers without any problem. No sensory or motor abnormality distally. Return in 2 weeks for reevaluation and x-rays at that time. She will start physical therapy at that juncture. I have personally performed and/or participated in the history, exam and medical decision making and agree with all pertinent clinical information. I have also reviewed and agree with the past medical, family and social history unless otherwise noted. This dictation was performed with a verbal recognition program ([employer]) and it was checked for errors. It is possible that there are still dictated errors within this office note. If so, please bring any errors to my attention for an addendum. All efforts were made to ensure that this office note is accurate.           Kaitlynn Kenny MD

## 2021-01-04 ENCOUNTER — OFFICE VISIT (OUTPATIENT)
Dept: ORTHOPEDIC SURGERY | Age: 66
End: 2021-01-04

## 2021-01-04 VITALS — HEIGHT: 62 IN | WEIGHT: 150 LBS | BODY MASS INDEX: 27.6 KG/M2

## 2021-01-04 DIAGNOSIS — M75.82 ROTATOR CUFF TENDINITIS, LEFT: Primary | ICD-10-CM

## 2021-01-04 DIAGNOSIS — M19.012 ARTHRITIS OF LEFT ACROMIOCLAVICULAR JOINT: ICD-10-CM

## 2021-01-04 DIAGNOSIS — M25.562 LEFT KNEE PAIN, UNSPECIFIED CHRONICITY: ICD-10-CM

## 2021-01-04 DIAGNOSIS — S46.012A TRAUMATIC COMPLETE TEAR OF LEFT ROTATOR CUFF, INITIAL ENCOUNTER: ICD-10-CM

## 2021-01-04 PROCEDURE — 99024 POSTOP FOLLOW-UP VISIT: CPT | Performed by: PHYSICIAN ASSISTANT

## 2021-01-04 NOTE — PROGRESS NOTES
OPERATION PERFORMED:  1.  Examination under anesthesia and video arthroscopy, left shoulder. 2.  Mini-open rotator cuff repair using two [employer] HEALICOIL  anchors and two [employer] and [employer] MultiFix anchors. 3.  Arthroscopic Neer acromioplasty. 4.  Arthroscopic Arleth procedure. 5.  Debridement of the glenohumeral articulation, inferior surface of  the rotator cuff, frayed superior and anterior labrum. 6.  Manipulation under anesthesia.     Patient surgery was 12/15/2020. History of present illness: Patient returns today for follow-up regarding her LEFT shoulder. Overall her pain is controlled. Pain control has been satisfactory with oral medications. She is complaining of some soreness and stiffness in her LEFT knee over the last 2 weeks. No known injury or trauma. Physical examination:  Incisions are well-healed with no signs of infection. Neurovascular exam is intact.  strength is intact. Good range of motion of the elbow wrist and hand. X-rays: 3 x-rays of the left  shoulder reveal evidence of near acromioplasty Arleth procedure with no acute bony abnormalities    We also obtained 3 x-ray views of the Lefton knee, mild medial patellofemoral joint line narrowing but no evidence of acute abnormalities     Assessment/plan: The patient is doing well after shoulder arthroscopy. Were going to begin physical therapy working on range of motion and transitioning to strength. I have given her some home exercises for her last knee, she is going to continue with conservative treatment. If her LEFT knee symptoms persist at her next visit, we discussed the possibility of an injection.

## 2021-01-11 ENCOUNTER — HOSPITAL ENCOUNTER (OUTPATIENT)
Dept: PHYSICAL THERAPY | Age: 66
Setting detail: THERAPIES SERIES
Discharge: HOME | End: 2021-01-11
Payer: COMMERCIAL

## 2021-01-11 PROCEDURE — 97110 THERAPEUTIC EXERCISES: CPT

## 2021-01-11 PROCEDURE — 97140 MANUAL THERAPY 1/> REGIONS: CPT

## 2021-01-11 PROCEDURE — 97161 PT EVAL LOW COMPLEX 20 MIN: CPT

## 2021-01-11 NOTE — FLOWSHEET NOTE
[facility], [facility], [address] [place] [address]  Phone: [phone]  Fax [phone]    Physical Therapy Daily Treatment Note    Date:  2021    Patient Name:  Erika Munoz    :  1955  MRN: [id number]  Restrictions/Precautions:    Medical/Treatment Diagnosis Information:  · Diagnosis: S46.012A (ICD-10-CM) - Traumatic complete tear of left rotator cuff, initial encounter  Insurance/Certification information:  PT Insurance Information: [facility]  Physician Information:  Referring Practitioner: Debbie Vázquez  Plan of care signed (Y/N):  Y  Visit# / total visits:       G-Code (if applicable): Quick Dash Sum Score: 32/55   Jorge Alberto Renteria Calculated Score: 48%       Time in:   9:45      Timed Treatment: 26 Total Treatment Time:  45  Time out: 10:30  ________________________________________________________________________________________    Pain Level:    /10  SUBJECTIVE:  See Initial Evaluation     OBJECTIVE:     Exercise/Equipment Resistance/Repetitions Other comments     Shrugs    Scap retraction x10  x10      Elbow Flex/Ext   Elbow sup/pro x10  x10      Wrist flex/ext x10      Wrist circles  x10      Towel squeezes x10      Passive ER x10      Passive flexion x10                                                                                             Other Therapeutic Activities:  Pt was educated on surgery, diagnosis, involved anatomy, Protective stage of therapy, HEP, established goals, and facility policies. PT stressed importance of HEP compliance and form to ensure passive movement during this phase of therapy.      Manual Treatments:     Passive ER  Passive flexion   GHJ mobs grade 1-2       Modalities:      Test/Measurements:         ASSESSMENT:    See Initial Evaluation      Treatment/Activity Tolerance:   [x]Patient tolerated treatment well [] Patient limited by rishabh [x]Patient limited by pain [] Patient limited by other medical complications  [] Other:     Goals:        Long term goals  Time Frame for Long term goals : 6 weeks  Long term goal 1: Pt will be independent and compliant with HEP  Long term goal 2: Pt will improve LUE strength to at least 4/5 gross strength  Long term goal 3: Pt will improve LUE ROM to within 15 degrees of opposite arm  Long term goal 4: Pt will be able to don/doff jacket with no increase in shoulder pain  Long term goal 5: Pt will improve Quick Dash score by at least 10 points to indicate significant improvement in function     Plan: [] Continue per plan of care [] Alter current plan (see comments)   [x] Plan of care initiated [] Hold pending MD visit [] Discharge      Plan for Next Session:      Re-Certification Due Date:         Signature:   Heron Land PT

## 2021-01-11 NOTE — PROGRESS NOTES
Physical Therapy  Initial Assessment  Date: 2021  Patient Name: Gordon Portillo  MRN: [id number]  : 1955     Treatment Diagnosis: L shoulder pain    Subjective   General  Chart Reviewed: Yes  Patient assessed for rehabilitation services?: Yes  Family / Caregiver Present: No  Referring Practitioner: Leanna Solis  Diagnosis: S46.012A (ICD-10-CM) - Traumatic complete tear of left rotator cuff, initial encounter  PT Visit Information  Onset Date: 21  PT Insurance Information: [facility]  Total # of Visits Approved: 20(0 used to date)  Subjective  Subjective: Pt had a L RCR on the . Pt said they are going. Pt said she does not have a lot of pain. Pt said she can get sore when she takes the sling off. Pt was told she is able to take ths sling of when resting and showering. Otherwise, pt is wearing sling when moving and sleeping. Pt is able to don and doff brace by herself. Pt is sleeping in a recliner. Pt said the shoulder does not affect her from sleeping. Pt is not driving her self. Pt was told to call in 3 weeks to set up an appointment around the . Pt was not given any exercises. Pt has 0/10 pain when sitting. C-SSRS Triggered by Intake questionnaire (Past 2 wk assessment):   [x] No, Questionnaire did not trigger screening.   [] Yes, Patient intake triggered further evaluation      [] C-SSRS Screening completed  [] PCP notified via Plan of Care  [] Emergency services notified    No Latex Allergies     Orientation  Orientation  Overall Orientation Status: Within Normal Limits      Objective     Observation/Palpation  Posture: Fair  Palpation: Mils TTP over surgerical site.  No tenderness over biceps  Observation: Mild rounded shoulders and FHP    PROM RUE (degrees)  RUE PROM: WNL  AROM RUE (degrees)  RUE AROM : WNL  PROM LUE (degrees)  LUE General PROM: Can achieve neutral IR/ER at 45 degrees of abduction, ~30 degrees of flexion at 45 degrees of abduction Joint Mobility  Spine: L GHJ hypomobility    Strength RUE  Strength RUE: WFL  Strength LUE  Comment: Not tested secondary to post-op status  L Elbow Flexion: 4/5  L Elbow Extension: 4/5  L Wrist Flexion: 4+/5  L Wrist Extension: 4+/5     Additional Measures  Special Tests: clonus (-), Rivas (-), DTR: 2+ Normal Bilaterally biceps, triceps, and brachioradialis  Sensation  Overall Sensation Status: WNL    Assessment   Conditions Requiring Skilled Therapeutic Intervention  Body structures, Functions, Activity limitations: Decreased functional mobility ; Decreased coordination;Decreased ADL status; Increased pain;Decreased ROM; Decreased strength;Decreased posture;Decreased high-level IADLs  Assessment: Pt is a 62 y/o female that presents to [facility] s/p L rotator cuff repair on 12/15/2020. Pt presents with impaired ROM, strength, ability to perform ADLs, and increased pain. Pt would benefit from skilled physical therapy in order to improve impairments, progress toward goals, and to maximize function.   Treatment Diagnosis: L shoulder pain  Prognosis: Good  Decision Making: Low Complexity  REQUIRES PT FOLLOW UP: Yes         Plan   Plan  Times per week: 2x/week for 6 weeks  Plan weeks: 6 weeks  Current Treatment Recommendations: Strengthening, IADL Training, Neuromuscular Re-education, Home Exercise Program, ROM, Manual Therapy - Soft Tissue Mobilization, Patient/Caregiver Education & Training, Functional Mobility Training, Manual Therapy - Joint Manipulation, Modalities, Pain Management, ADL/Self-care Training    G-Code   Quick Dash Sum Score: 32/55   Quick Dash Calculated Score: 48%    Goals  Long term goals  Time Frame for Long term goals : 6 weeks  Long term goal 1: Pt will be independent and compliant with HEP  Long term goal 2: Pt will improve LUE strength to at least 4/5 gross strength  Long term goal 3: Pt will improve LUE ROM to within 15 degrees of opposite arm Electronically signed by:  Hugo Chavez PT      If you have any questions or concerns, please don't hesitate to call.   Thank you for your referral.    Physician Signature:________________________________Date:__________________  By signing above, therapists plan is approved by physician

## 2021-01-15 ENCOUNTER — HOSPITAL ENCOUNTER (OUTPATIENT)
Dept: PHYSICAL THERAPY | Age: 66
Setting detail: THERAPIES SERIES
Discharge: HOME | End: 2021-01-15
Payer: COMMERCIAL

## 2021-01-15 PROCEDURE — 97110 THERAPEUTIC EXERCISES: CPT

## 2021-01-15 PROCEDURE — 97140 MANUAL THERAPY 1/> REGIONS: CPT

## 2021-01-15 NOTE — FLOWSHEET NOTE
[facility], [facility], [facility] [address]  Phone: [phone]  Fax [phone]    Physical Therapy Daily Treatment Note    Date:  1/15/2021    Patient Name:  Leon Mars    :  1955  MRN: [id number]  Restrictions/Precautions:    Medical/Treatment Diagnosis Information:  · Diagnosis: S46.012A (ICD-10-CM) - Traumatic complete tear of left rotator cuff, initial encounter  Insurance/Certification information:  PT Insurance Information: [facility]  Physician Information:  Referring Practitioner: Wash Boast  Plan of care signed (Y/N):  Y  Visit# / total visits:       G-Code (if applicable): Quick Dash Sum Score: 32/55   Trell Gore Calculated Score: 48%       Time in:   14:55    Timed Treatment: 45 Total Treatment Time:  45  Time out: 15:35  ________________________________________________________________________________________    Pain Level:    /10  SUBJECTIVE:  Pt said she is doing well and does not have any pain. Exercises have been going well at home. OBJECTIVE:     Exercise/Equipment Resistance/Repetitions Other comments     Shrugs    Scap retraction x15  x15      Elbow Flex/Ext   Elbow sup/pro x15  x115      Wrist flex/ext x15      Wrist circles  x15B      Towel squeezes 5x15      Passive ER x15      Passive flexion x15                                                                                             Other Therapeutic Activities:      Manual Treatments:     Passive ER  Passive flexion   GHJ mobs grade 1-2 for pain     Modalities:      Test/Measurements:         ASSESSMENT:   Pt tolerated treatment well with minimal pain during TE and manual. Improved ROM noted this date. TE reps increased this date. Pt demonstrated good understanding of exercises and importance of form.  PT continued to stress importance of PROM during protective phase      Treatment/Activity Tolerance:   [x]Patient tolerated treatment well [] Patient limited by fatique  [x]Patient limited by pain [] Patient limited by other medical complications  [] Other:     Goals:        Long term goals  Time Frame for Long term goals : 6 weeks  Long term goal 1: Pt will be independent and compliant with HEP  Long term goal 2: Pt will improve LUE strength to at least 4/5 gross strength  Long term goal 3: Pt will improve LUE ROM to within 15 degrees of opposite arm  Long term goal 4: Pt will be able to don/doff jacket with no increase in shoulder pain  Long term goal 5: Pt will improve Quick Dash score by at least 10 points to indicate significant improvement in function     Plan: [x] Continue per plan of care [] Alter current plan (see comments)   [] Plan of care initiated [] Hold pending MD visit [] Discharge      Plan for Next Session:      Re-Certification Due Date:         Signature:   Alexa Adams PT

## 2021-01-20 ENCOUNTER — HOSPITAL ENCOUNTER (OUTPATIENT)
Dept: PHYSICAL THERAPY | Age: 66
Setting detail: THERAPIES SERIES
Discharge: HOME | End: 2021-01-20
Payer: COMMERCIAL

## 2021-01-20 PROCEDURE — 97110 THERAPEUTIC EXERCISES: CPT

## 2021-01-20 PROCEDURE — 97140 MANUAL THERAPY 1/> REGIONS: CPT

## 2021-01-20 NOTE — FLOWSHEET NOTE
[facility], [facility], [address] [place] [address]  Phone: [phone]  Fax [phone]    Physical Therapy Daily Treatment Note    Date:  2021    Patient Name:  Ashanti Ni    :  1955  MRN: [id number]  Restrictions/Precautions:    Medical/Treatment Diagnosis Information:  · Diagnosis: S46.012A (ICD-10-CM) - Traumatic complete tear of left rotator cuff, initial encounter  Insurance/Certification information:  PT Insurance Information: [facility]  Physician Information:  Referring Practitioner: Sumi Jimenez  Plan of care signed (Y/N):  Y  Visit# / total visits:  3/12     G-Code (if applicable): Quick Dash Sum Score: 32/55   Erroll Ek Calculated Score: 48%       Time in:   10:25    Timed Treatment: 45 Total Treatment Time:  45  Time out: 11:10  ________________________________________________________________________________________    Pain Level:    /10  SUBJECTIVE:  Pt said she is doing well and does not have any pain. Exercises have been going well at home. OBJECTIVE:     Exercise/Equipment Resistance/Repetitions Other comments     Shrugs    Scap retraction x20  x20      Elbow Flex/Ext   Elbow sup/pro x20  x20      Wrist flex/ext       Wrist circles        Towel squeezes 5x20      Passive ER x15      Passive flexion x20      Pendulums        L/R      Up/down   x15  x15                                                                                      Other Therapeutic Activities:      Manual Treatments:     Passive ER  Passive flexion   GHJ mobs grade 1-2 for pain     Modalities:      Test/Measurements:    90 degrees of passive flexion         ASSESSMENT:   Pt tolerated treatment well with minimal pain during TE and manual. Improved ROM noted this date. TE reps increased this date. Pt demonstrated good understanding of exercises and importance of form.  PT continued to stress importance of PROM during

## 2021-01-22 ENCOUNTER — HOSPITAL ENCOUNTER (OUTPATIENT)
Dept: PHYSICAL THERAPY | Age: 66
Setting detail: THERAPIES SERIES
Discharge: HOME | End: 2021-01-22
Payer: COMMERCIAL

## 2021-01-22 PROCEDURE — 97110 THERAPEUTIC EXERCISES: CPT

## 2021-01-22 PROCEDURE — 97140 MANUAL THERAPY 1/> REGIONS: CPT

## 2021-01-22 NOTE — FLOWSHEET NOTE
[facility], [facility], [address] [facility], [address] [place] [address]  Phone: [phone]  Fax [phone]    Physical Therapy Daily Treatment Note    Date:  2021    Patient Name:  Chelsey Graham    :  1955  MRN: [id number]  Restrictions/Precautions:    Medical/Treatment Diagnosis Information:  · Diagnosis: S46.012A (ICD-10-CM) - Traumatic complete tear of left rotator cuff, initial encounter  Insurance/Certification information:  PT Insurance Information: [facility]  Physician Information:  Referring Practitioner: Arvind Garcia  Plan of care signed (Y/N):  Y  Visit# / total visits:       G-Code (if applicable):             Quick Dash Sum Score: 32/55   Raman Postal Calculated Score: 48%       Time in:   15:00    Timed Treatment: 45 Total Treatment Time:  45  Time out: 15:45  ________________________________________________________________________________________    Pain Level:    /10  SUBJECTIVE:  Pt said she has continued to feel pretty good with minimal pain     OBJECTIVE:     Exercise/Equipment Resistance/Repetitions Other comments     Shrugs    Scap retraction x20  x20      Elbow Flex/Ext   Elbow sup/pro x20  x20      Wrist flex/ext       Wrist circles        Towel squeezes 5x20      Passive ER x15      Passive flexion x20      Pendulums        L/R      Up/down   x15  x15                                                                                      Other Therapeutic Activities:      Manual Treatments:     Passive ER  Passive flexion   GHJ mobs grade 1-2 for pain     Modalities:      Test/Measurements:    90 degrees of passive flexion ASSESSMENT:   Pt tolerated treatment well with minimal pain during TE and manual. Improved flexion ROM noted this date. Pt demonstrated good understanding of exercises and importance of form. PT continued to stress importance of PROM during protective phase. Flexion progressing well. Pt continues to be limited in ER. PT continued to stress importance of passive ER at home. Treatment/Activity Tolerance:   [x]Patient tolerated treatment well [] Patient limited by fatique  [x]Patient limited by pain [] Patient limited by other medical complications  [] Other:     Goals:        Long term goals  Time Frame for Long term goals : 6 weeks  Long term goal 1: Pt will be independent and compliant with HEP  Long term goal 2: Pt will improve LUE strength to at least 4/5 gross strength  Long term goal 3: Pt will improve LUE ROM to within 15 degrees of opposite arm  Long term goal 4: Pt will be able to don/doff jacket with no increase in shoulder pain  Long term goal 5: Pt will improve Quick Dash score by at least 10 points to indicate significant improvement in function     Plan: [x] Continue per plan of care [] Alter current plan (see comments)   [] Plan of care initiated [] Hold pending MD visit [] Discharge      Plan for Next Session:      Re-Certification Due Date:         Signature:   Glynn Diallo , PT

## 2021-01-27 ENCOUNTER — HOSPITAL ENCOUNTER (OUTPATIENT)
Dept: PHYSICAL THERAPY | Age: 66
Setting detail: THERAPIES SERIES
Discharge: HOME | End: 2021-01-27
Payer: COMMERCIAL

## 2021-01-27 PROCEDURE — 97140 MANUAL THERAPY 1/> REGIONS: CPT

## 2021-01-27 PROCEDURE — 97110 THERAPEUTIC EXERCISES: CPT

## 2021-01-27 NOTE — FLOWSHEET NOTE
[facility]  [facility]. and Therapy, [facility], [address] [facility], [address] [place] [address] Dr  Phone: [phone]  Fax [phone]    Physical Therapy Daily Treatment Note    Date:  2021    Patient Name:  Artie Yang    :  1955  MRN: [id number]  Restrictions/Precautions:    Medical/Treatment Diagnosis Information:  · Diagnosis: S46.012A (ICD-10-CM) - Traumatic complete tear of left rotator cuff, initial encounter  Insurance/Certification information:  PT Insurance Information: [facility]  Physician Information:  Referring Practitioner: Manny Acevedo  Plan of care signed (Y/N):  Y  Visit# / total visits:       G-Code (if applicable): Quick Dash Sum Score: 32/55   Pedro Henning Calculated Score: 48%       Time in:   15:00    Timed Treatment: 42 Total Treatment Time:  42  Time out: 15:45  ________________________________________________________________________________________    Pain Level:    /10  SUBJECTIVE:  Pt said she has continued to feel pretty good with minimal pain     OBJECTIVE:     Exercise/Equipment Resistance/Repetitions Other comments     Shrugs    Scap retraction x20  x20      Elbow Flex/Ext   Elbow sup/pro x20        Wrist flex/ext       Wrist circles        Towel squeezes       Passive ER 2x10\"x10      Passive flexion x20      Pendulums        L/R      Up/down                                                                                         Other Therapeutic Activities:      Manual Treatments:     Passive ER  Passive flexion   GHJ mobs grade 1-2 for pain     30 minutes total     Modalities:      Test/Measurements:    110 degrees of passive flexion         ASSESSMENT:   Pt tolerated treatment well with minimal pain during TE and manual. Improved flexion an ER ROM noted this date. Pt continues to demonstrate good understanding of exercises and importance of form. PT continued to stress importance of PROM during protective phase.  Flexion continued to progress well. Pt continues to be limited in ER, although slightly better this date. PT continued to stress importance of passive ER at home. Treatment/Activity Tolerance:   [x]Patient tolerated treatment well [] Patient limited by fatique  [x]Patient limited by pain [] Patient limited by other medical complications  [] Other:     Goals:        Long term goals  Time Frame for Long term goals : 6 weeks  Long term goal 1: Pt will be independent and compliant with HEP  Long term goal 2: Pt will improve LUE strength to at least 4/5 gross strength  Long term goal 3: Pt will improve LUE ROM to within 15 degrees of opposite arm  Long term goal 4: Pt will be able to don/doff jacket with no increase in shoulder pain  Long term goal 5: Pt will improve Quick Dash score by at least 10 points to indicate significant improvement in function     Plan: [x] Continue per plan of care [] Alter current plan (see comments)   [] Plan of care initiated [] Hold pending MD visit [] Discharge      Plan for Next Session:      Re-Certification Due Date:         Signature:   Linsey Alvarez PT

## 2021-01-29 ENCOUNTER — HOSPITAL ENCOUNTER (OUTPATIENT)
Dept: PHYSICAL THERAPY | Age: 66
Setting detail: THERAPIES SERIES
Discharge: HOME | End: 2021-01-29
Payer: COMMERCIAL

## 2021-01-29 ENCOUNTER — APPOINTMENT (OUTPATIENT)
Dept: PHYSICAL THERAPY | Age: 66
End: 2021-01-29
Payer: COMMERCIAL

## 2021-01-29 PROCEDURE — 97140 MANUAL THERAPY 1/> REGIONS: CPT

## 2021-01-29 PROCEDURE — 97110 THERAPEUTIC EXERCISES: CPT

## 2021-01-29 NOTE — FLOWSHEET NOTE
[facility]  [facility]. and Therapy, [facility], [address] [facility], [address] [place] [address]  Phone: [phone]  Fax [phone]    Physical Therapy Daily Treatment Note    Date:  2021    Patient Name:  Nery Greer    :  1955  MRN: [id number]  Restrictions/Precautions:    Medical/Treatment Diagnosis Information:  · Diagnosis: S46.012A (ICD-10-CM) - Traumatic complete tear of left rotator cuff, initial encounter  Insurance/Certification information:  PT Insurance Information: [facility]  Physician Information:  Referring Practitioner: Tyrone Steele  Plan of care signed (Y/N):  Y  Visit# / total visits:  3/12     G-Code (if applicable): Quick Dash Sum Score: 32/55   Beth Nioclas Calculated Score: 48%       Time in:   12:45  Timed Treatment: 50 Total Treatment Time:  50  Time out: 13:35  ________________________________________________________________________________________    Pain Level:    2/10  SUBJECTIVE:  Pt said she has continued to feel pretty good with minimal pain     OBJECTIVE:     Exercise/Equipment Resistance/Repetitions Other comments     Shrugs    Scap retraction x20  x20      Elbow Flex/Ext   Elbow sup/pro x20        Wrist flex/ext       Wrist circles        Towel squeezes       Passive ER- sitting  Passive ER - supine  10\"x10  10\"x10      Passive flexion x20      Pendulums        L/R      Up/down                                                                                         Other Therapeutic Activities:      Manual Treatments:     Passive ER  Passive flexion   GHJ mobs grade 1-2 for pain     30 minutes total     Modalities:      Test/Measurements:    115 degrees of passive flexion  35 degrees of ER at 45 degrees of abduction          ASSESSMENT:   Pt tolerated treatment well with minimal pain during TE and manual. Improved flexion an ER ROM noted this date.  Pt continues to demonstrate good understanding of exercises and importance of form. PT continued to stress importance of PROM during protective phase. Flexion continued to progress well. Er measured to be 35 degrees at 45 degrees of abduction. PT continued to stress importance of passive ER at home. Treatment/Activity Tolerance:   [x]Patient tolerated treatment well [] Patient limited by fatique  [x]Patient limited by pain [] Patient limited by other medical complications  [] Other:     Goals:        Long term goals  Time Frame for Long term goals : 6 weeks  Long term goal 1: Pt will be independent and compliant with HEP  Long term goal 2: Pt will improve LUE strength to at least 4/5 gross strength  Long term goal 3: Pt will improve LUE ROM to within 15 degrees of opposite arm  Long term goal 4: Pt will be able to don/doff jacket with no increase in shoulder pain  Long term goal 5: Pt will improve Quick Dash score by at least 10 points to indicate significant improvement in function     Plan: [x] Continue per plan of care [] Alter current plan (see comments)   [] Plan of care initiated [] Hold pending MD visit [] Discharge      Plan for Next Session:      Re-Certification Due Date:         Signature:   Jonna Adamson , PT

## 2021-02-03 ENCOUNTER — HOSPITAL ENCOUNTER (OUTPATIENT)
Dept: PHYSICAL THERAPY | Age: 66
Setting detail: THERAPIES SERIES
Discharge: HOME | End: 2021-02-03
Payer: COMMERCIAL

## 2021-02-03 PROCEDURE — 97140 MANUAL THERAPY 1/> REGIONS: CPT

## 2021-02-03 PROCEDURE — 97110 THERAPEUTIC EXERCISES: CPT

## 2021-02-03 NOTE — FLOWSHEET NOTE
[facility]. and Therapy, [address]  Phone: [phone]  Fax [phone]    Physical Therapy Daily Treatment Note    Date:  2/3/2021    Patient Name:  Mar Clarke    :  1955  MRN: [id number]  Restrictions/Precautions:    Medical/Treatment Diagnosis Information: Follow Up on the    · Diagnosis: S46.012A (ICD-10-CM) - Traumatic complete tear of left rotator cuff, initial encounter  Insurance/Certification information:  PT Insurance Information: [facility]  Physician Information:  Referring Practitioner: Gurjit Johnson  Plan of care signed (Y/N):  Y  Visit# / total visits:       G-Code (if applicable):             Quick Dash Sum Score: 32/55   Teena Dugan Calculated Score: 48%       Time in:   15:05  Timed Treatment: 40 Total Treatment Time:  40  Time out: 15:45  ________________________________________________________________________________________    Pain Level:    210  SUBJECTIVE:  Pt said she has continued to feel pretty good with minimal pain     OBJECTIVE:     Exercise/Equipment Resistance/Repetitions Other comments     Shrugs    Scap retraction x15  x15      Elbow Flex/Ext   Elbow sup/pro x20        Wrist flex/ext       Wrist circles        Towel squeezes       Passive ER- sitting  Passive ER - supine  10\"x10  5\"x15      Passive flexion x20      Pendulums        L/R      Up/down                  Table slides  10\"x10 HEP      Cane        Isometrics       Flex      Ext       Abd       IR     ER   nv  nv  nv  nv  nv      Pulley        flexion   nv                                                Other Therapeutic Activities:      Manual Treatments:     Passive ER  Passive flexion   GHJ mobs grade 1-2 for pain     30 minutes total     Modalities:      Test/Measurements:    125 degrees of passive flexion  35 degrees of ER at 45 degrees of abduction          ASSESSMENT:   Pt tolerated treatment well with minimal pain during TE and manual. Improved flexion an ER ROM noted this date. Pt continues to demonstrate good understanding of exercises and importance of form. PT continued to stress importance of PROM during protective phase. Flexion continued to progress well. Er improved this date as pt achieved 50 degrees at ~60 degrees of abduction. PT continued to stress importance of passive ER at home. HEP updated. Treatment/Activity Tolerance:   [x]Patient tolerated treatment well [] Patient limited by fatique  [x]Patient limited by pain [] Patient limited by other medical complications  [] Other:     Goals:        Long term goals  Time Frame for Long term goals : 6 weeks  Long term goal 1: Pt will be independent and compliant with HEP  Long term goal 2: Pt will improve LUE strength to at least 4/5 gross strength  Long term goal 3: Pt will improve LUE ROM to within 15 degrees of opposite arm  Long term goal 4: Pt will be able to don/doff jacket with no increase in shoulder pain  Long term goal 5: Pt will improve Quick Dash score by at least 10 points to indicate significant improvement in function     Plan: [x] Continue per plan of care [] Alter current plan (see comments)   [] Plan of care initiated [] Hold pending MD visit [] Discharge      Plan for Next Session:      Re-Certification Due Date:         Signature:   Marsha Christian PT

## 2021-02-05 ENCOUNTER — APPOINTMENT (OUTPATIENT)
Dept: PHYSICAL THERAPY | Age: 66
End: 2021-02-05
Payer: COMMERCIAL

## 2021-02-10 ENCOUNTER — HOSPITAL ENCOUNTER (OUTPATIENT)
Dept: PHYSICAL THERAPY | Age: 66
Setting detail: THERAPIES SERIES
Discharge: HOME | End: 2021-02-10
Payer: COMMERCIAL

## 2021-02-10 NOTE — PROGRESS NOTES
Physical Therapy        Physical Therapy  Cancellation/No-show Note  Patient Name:  Rissa Collins  :  1955   Date:  2/10/2021  Cancels to date: 1  No-shows to date: 0    For today's appointment patient:  [x] Cancelled  [x] Rescheduled appointment  [] No-show     Reason given by patient:  [] Patient ill  [] Conflicting appointment  [] No transportation    [] Conflict with work  [] No reason given  [x] Other:     Comments:  Pt called and canceled due to weather. [place] Afraid the roads will get slick and she couldn't get up the hill to get home.     Electronically signed by:  Candido Vu PT

## 2021-02-12 ENCOUNTER — HOSPITAL ENCOUNTER (OUTPATIENT)
Dept: PHYSICAL THERAPY | Age: 66
Setting detail: THERAPIES SERIES
Discharge: HOME | End: 2021-02-12
Payer: COMMERCIAL

## 2021-02-12 PROCEDURE — 97140 MANUAL THERAPY 1/> REGIONS: CPT

## 2021-02-12 PROCEDURE — 97110 THERAPEUTIC EXERCISES: CPT

## 2021-02-12 NOTE — FLOWSHEET NOTE
[facility], [address], [address] [place] [address] Dr  Phone: [phone]  Fax [phone]    Physical Therapy Daily Treatment Note    Date:  2021    Patient Name:  Jason Shen    :  1955  MRN: [id number]  Restrictions/Precautions:    Medical/Treatment Diagnosis Information: Follow Up on the    · Diagnosis: S46.012A (ICD-10-CM) - Traumatic complete tear of left rotator cuff, initial encounter  Insurance/Certification information:  PT Insurance Information: Floyd Kendrick  Physician Information:  Referring Practitioner: Jos Shen  Plan of care signed (Y/N):  Y  Visit# / total visits:       G-Code (if applicable): Josph Sharper Sum Score: 32/55   Quick Dash Calculated Score: 48%     Quick Dash Sum Score: 27/55  Josph Sharper Calculated Score 36%      Time in:   15:05  Timed Treatment: 40 Total Treatment Time:  45  Time out: 15:45  ________________________________________________________________________________________    Pain Level:    2/10  SUBJECTIVE:  Pt said she saw MD and was told everything looked good and can start to progress.      OBJECTIVE:     Exercise/Equipment Resistance/Repetitions Other comments     Shrugs    Scap retraction x15  x15      Elbow Flex/Ext   Elbow sup/pro         Wrist flex/ext       Wrist circles        Towel squeezes       Passive ER- sitting  Passive ER - supine      AAROM flexion  AAROM scaption  x20  x20      Pendulums        L/R      Up/down                  Table slides  10\"x10 HEP      Cane        Isometrics       Flex      Ext       Abd       IR     ER   nv  nv  nv  nv  nv      Pulley        flexion   nv                                                Other Therapeutic Activities:    HEP: [id number]        Manual Treatments:     Passive ER  Passive flexion   GHJ mobs grade 1-2 for pain     18 minutes total     Modalities:      Test/Measurements:    125 degrees of passive flexion 70 degrees of ER at 90 degrees of abduction          ASSESSMENT:   Pt tolerated treatment well with minimal pain during TE and manual. Improved flexion an ER ROM noted this date. Pt continues to demonstrate good understanding of exercises and importance of form. PT continued to stress importance of PROM during protective phase. Flexion continued to progress well. Er improved this date as pt achieved 50 degrees at ~60 degrees of abduction. PT continued to stress importance of passive ER at home. HEP updated. Treatment/Activity Tolerance:   [x]Patient tolerated treatment well [] Patient limited by fatique  [x]Patient limited by pain [] Patient limited by other medical complications  [] Other:     Goals:        Long term goals  Time Frame for Long term goals : 6 weeks  Long term goal 1: Pt will be independent and compliant with HEP   Long term goal 2: Pt will improve LUE strength to at least 4/5 gross strength  Long term goal 3: Pt will improve LUE ROM to within 15 degrees of opposite arm  Long term goal 4: Pt will be able to don/doff jacket with no increase in shoulder pain  Long term goal 5: Pt will improve Quick Dash score by at least 10 points to indicate significant improvement in function     Plan: [x] Continue per plan of care [] Alter current plan (see comments)   [] Plan of care initiated [] Hold pending MD visit [] Discharge      Plan for Next Session:      Re-Certification Due Date:         Signature:   Mary Hughes PT

## 2021-02-12 NOTE — PROGRESS NOTES
Physical Therapy        Outpatient Physical Therapy  Phone: [phone] Fax: [phone]     To: Sandra Desir    From: Ana Johnson PT     Patient: Norberto Quiles     : 1955  Diagnosis: T89.545Q (ICD-10-CM) - Traumatic complete tear of left rotator cuff, initial encounter         Date: 2021  Treatment Diagnosis:  L shoulder Pain     Physical Therapy Progress Note    Total Visits to date:    Cancels/No-shows to date:  1 cancel     Plan of Care/Treatment to date:  [x] Therapeutic Exercise    [] Modalities:  [] Therapeutic Activity     [] Ultrasound   [] Electrical Stimulation   [] Gait Training      [] Cervical Traction   [] Lumbar Traction  [x] Neuromuscular Re-education  [] Cold/hotpack  [] Iontophoresis  [x] Instruction in HEP      Other:  [x] Manual Therapy       []                                 [] Aquatic Therapy       []                                     Significant Findings At Last Visit/Comments:    · Isometric strengthening initiated   · Er improved to 70 degrees at 90 degrees of abduction   · 135 degrees of flexion  · AAROM initiated and prescribed as HEP  · Continued to have good pain management   · Reported compliant with HEP   · Will progress strengthening and ROM activities per protocol and per pt tolerance    · PT recommending pt continue current POC of 12 total visits, and then continue therapy 2x/week for 4 weeks to continue to progress strengthening, ROM, muscle firing patterns, and to maximize function of LUE at home and in community.        Progress towards goals:  Pt is progressing toward all goals   Long term goals  Time Frame for Long term goals : 6 weeks  Long term goal 1: Pt will be independent and compliant with HEP   Long term goal 2: Pt will improve LUE strength to at least 4/5 gross strength  Long term goal 3: Pt will improve LUE ROM to within 15 degrees of opposite arm  Long term goal 4: Pt will be able to don/doff jacket with no increase in shoulder pain Long term goal 5: Pt will improve Quick Dash score by at least 10 points to indicate significant improvement in function     Frequency/Duration:  # Days per week: [] 1 day # Weeks: [] 1 week [] 4 weeks      [x] 2 days   [] 2 weeks [] 5 weeks     [] 3 days   [] 3 weeks [x] 6 weeks     Rehab Potential: [] Excellent [x] Good [] Fair  [] Poor     Goal Status:  [] Achieved [x] Partially Achieved  [] Not Achieved     Patient Status: [] Continue per initial plan of Care     [] Patient now discharged     [x] Additional visits requested, Please re-certify for additional visits:      Requested frequency/duration:  2x/week for 4 weeks    Electronically signed by:  Jesus Carranza PT    If you have any questions or concerns, please don't hesitate to call.   Thank you for your referral.    Physician Signature:________________________________Date:__________________  By signing above, therapists plan is approved by physician

## 2021-02-15 ENCOUNTER — HOSPITAL ENCOUNTER (OUTPATIENT)
Dept: PHYSICAL THERAPY | Age: 66
Setting detail: THERAPIES SERIES
Discharge: HOME | End: 2021-02-15
Payer: COMMERCIAL

## 2021-02-15 NOTE — PROGRESS NOTES
Physical Therapy        Physical Therapy  Cancellation/No-show Note  Patient Name:  Gio Mean  :  1955   Date:  2/15/2021  Cancels to date: 2  No-shows to date: 0    For today's appointment patient:  [x] Cancelled  [x] Rescheduled appointment  [] No-show     Reason given by patient:  [] Patient ill  [] Conflicting appointment  [] No transportation    [] Conflict with work  [] No reason given  [x] Other:     Comments:  Due to weather   Electronically signed by:  Anthony Cervantes PT

## 2021-02-17 ENCOUNTER — HOSPITAL ENCOUNTER (OUTPATIENT)
Dept: PHYSICAL THERAPY | Age: 66
Setting detail: THERAPIES SERIES
Discharge: HOME | End: 2021-02-17
Payer: COMMERCIAL

## 2021-02-17 PROCEDURE — 97110 THERAPEUTIC EXERCISES: CPT

## 2021-02-17 PROCEDURE — 97140 MANUAL THERAPY 1/> REGIONS: CPT

## 2021-02-17 NOTE — FLOWSHEET NOTE
[facility], [facility], [address] [facility], [address]  Phone: [phone]  Fax [phone]    Physical Therapy Daily Treatment Note    Date:  2021    Patient Name:  Mike Edgar    :  1955  MRN: [id number]  Restrictions/Precautions:    Medical/Treatment Diagnosis Information: Follow Up on the    · Diagnosis: S46.012A (ICD-10-CM) - Traumatic complete tear of left rotator cuff, initial encounter  Insurance/Certification information:  PT Insurance Information: [employer] Est[employer]  Physician Information:  Referring Practitioner: Allen Stephen  Plan of care signed (Y/N):  Y  Visit# / total visits:       G-Code (if applicable): Alysia Jameson Sum Score: 32/55   Quick Dash Calculated Score: 48%     Quick Dash Sum Score: 27/55  Alysia Jameson Calculated Score 36%      Time in:   15:05  Timed Treatment: 40 Total Treatment Time:  45  Time out: 15:45  ________________________________________________________________________________________    Pain Level:    2/10  SUBJECTIVE:  Pt said she is doing well. No issues with HEP at home .   OBJECTIVE:     Exercise/Equipment Resistance/Repetitions Other comments     Shrugs    Scap retraction x15  x15      Elbow Flex/Ext   Elbow sup/pro         Wrist flex/ext       Wrist circles        Towel squeezes       Passive ER- sitting  Passive ER - supine      AAROM flexion  AAROM scaption  x20  x20      Pendulums        L/R      Up/down                  Table slides  10\"x10 HEP      Cane        Isometrics       Flex      Ext       Abd       IR     ER   5\"x10    5\"x10  5\"x10  5\"x10      Pulley        flexion   3 minutes                                                 Other Therapeutic Activities:    HEP: [id number]        Manual Treatments:     Passive ER  Passive flexion   GHJ mobs grade 1-2 for pain     18 minutes total     Modalities:      Test/Measurements:    125 degrees of passive flexion  70 degrees of ER at 90 degrees of abduction          ASSESSMENT:   Pt tolerated treatment well with minimal pain during TE and manual. Improved flexion an ER ROM noted this date. Pt continues to demonstrate good understanding of exercises and importance of form. PT continued to stress importance of PROM during protective phase. Flexion continued to progress well. Er improved this date as pt achieved 70 degrees at 90 degrees of abduction. PT continued to stress importance of passive ER at home. HEP updated. Treatment/Activity Tolerance:   [x]Patient tolerated treatment well [] Patient limited by fatique  [x]Patient limited by pain [] Patient limited by other medical complications  [] Other:     Goals:        Long term goals  Time Frame for Long term goals : 6 weeks  Long term goal 1: Pt will be independent and compliant with HEP   Long term goal 2: Pt will improve LUE strength to at least 4/5 gross strength  Long term goal 3: Pt will improve LUE ROM to within 15 degrees of opposite arm  Long term goal 4: Pt will be able to don/doff jacket with no increase in shoulder pain  Long term goal 5: Pt will improve Quick Dash score by at least 10 points to indicate significant improvement in function     Plan: [x] Continue per plan of care [] Alter current plan (see comments)   [] Plan of care initiated [] Hold pending MD visit [] Discharge      Plan for Next Session:      Re-Certification Due Date:         Signature:   Brigida Gerber PT

## 2021-02-24 ENCOUNTER — HOSPITAL ENCOUNTER (OUTPATIENT)
Dept: PHYSICAL THERAPY | Age: 66
Setting detail: THERAPIES SERIES
Discharge: HOME | End: 2021-02-24
Payer: COMMERCIAL

## 2021-02-24 PROCEDURE — 97140 MANUAL THERAPY 1/> REGIONS: CPT

## 2021-02-24 PROCEDURE — 97110 THERAPEUTIC EXERCISES: CPT

## 2021-02-24 NOTE — FLOWSHEET NOTE
[facility], [facility], [address] [place] [address]  Phone: [phone]  Fax [phone]    Physical Therapy Daily Treatment Note    Date:  2021    Patient Name:  Renu Jordan    :  1955  MRN: [id number]  Restrictions/Precautions:    Medical/Treatment Diagnosis Information: Follow Up on the    · Diagnosis: S46.012A (ICD-10-CM) - Traumatic complete tear of left rotator cuff, initial encounter  Insurance/Certification information:  PT Insurance Information: [facility]  Physician Information:  Referring Practitioner: Ann Pearce  Plan of care signed (Y/N):  Y  Visit# / total visits:  10/12     G-Code (if applicable): Donell Jean Baptiste Sum Score: 32/55   Quick Dash Calculated Score: 48%     Quick Dash Sum Score: 27/55  Donell [place] Calculated Score 36%      Time in:   12:00  Timed Treatment: 44 Total Treatment Time:  44  Time out: 12:44  ________________________________________________________________________________________    Pain Level:    2/10  SUBJECTIVE:  Pt said she is back to work. Continues to do well.  Good about doing the exercises \"50% of the time\"  OBJECTIVE:     Exercise/Equipment Resistance/Repetitions Other comments     Shrugs    Scap retraction x15  x15      Elbow Flex/Ext   Elbow sup/pro       Cane press  Cane Flexion x15  x15      Wrist flex/ext       Wrist circles        Towel squeezes       Passive ER- sitting  Passive ER - supine    10\"x15    AAROM flexion  AAROM scaption  x20  x20      Pendulums        L/R      Up/down         Front raises x10        Table slides             Flexion            Scaption    5\"x15  5\"x10 HEP      Cane        Isometrics       Flex      Ext       Abd       IR     ER         Pulley        flexion   4 minutes       Wall ladder 5\"x5      Rows   Shoulder extension x10  x10 Yellow  Yellow                                  Other Therapeutic Activities:    HEP: [id number] Manual Treatments:     Passive ER  Passive flexion   GHJ mobs grade 1-2 for pain     18 minutes total     Modalities:      Test/Measurements:    125 degrees of passive flexion  70 degrees of ER at 90 degrees of abduction          ASSESSMENT:   Pt tolerated treatment well with minimal pain during TE and manual. Improved flexion an ER ROM noted this date. Pt continues to demonstrate good understanding of exercises and importance of form. AAROM progressed and AROM initiated this date. Flexion continued to progress well. Er improved this date as pt achieved 75 degrees at 90 degrees of abduction. PT continued to stress importance of passive ER at home. HEP updated. Treatment/Activity Tolerance:   [x]Patient tolerated treatment well [] Patient limited by fatique  [x]Patient limited by pain [] Patient limited by other medical complications  [] Other:     Goals:        Long term goals  Time Frame for Long term goals : 6 weeks  Long term goal 1: Pt will be independent and compliant with HEP   Long term goal 2: Pt will improve LUE strength to at least 4/5 gross strength  Long term goal 3: Pt will improve LUE ROM to within 15 degrees of opposite arm  Long term goal 4: Pt will be able to don/doff jacket with no increase in shoulder pain  Long term goal 5: Pt will improve Quick Dash score by at least 10 points to indicate significant improvement in function     Plan: [x] Continue per plan of care [] Alter current plan (see comments)   [] Plan of care initiated [] Hold pending MD visit [] Discharge      Plan for Next Session:      Re-Certification Due Date:         Signature:   Ludmila Arthur , BETH

## 2021-02-26 ENCOUNTER — HOSPITAL ENCOUNTER (OUTPATIENT)
Dept: PHYSICAL THERAPY | Age: 66
Setting detail: THERAPIES SERIES
Discharge: HOME | End: 2021-02-26
Payer: COMMERCIAL

## 2021-02-26 PROCEDURE — 97140 MANUAL THERAPY 1/> REGIONS: CPT

## 2021-02-26 PROCEDURE — 97110 THERAPEUTIC EXERCISES: CPT

## 2021-02-26 NOTE — FLOWSHEET NOTE
[facility], [address], [address] [place] [address]  Phone: [phone]  Fax [phone]    Physical Therapy Daily Treatment Note    Date:  2021    Patient Name:  Rissa Collins    :  1955  MRN: [id number]  Restrictions/Precautions:    Medical/Treatment Diagnosis Information: Follow Up on the    · Diagnosis: S46.012A (ICD-10-CM) - Traumatic complete tear of left rotator cuff, initial encounter  Insurance/Certification information:  PT Insurance Information: [employer]  Physician Information:  Referring Practitioner: Rosa Maria Gamble  Plan of care signed (Y/N):  Y  Visit# / total visits:       G-Code (if applicable): Dorothy Ingram Sum Score: 32/55   Quick Dash Calculated Score: 48%     Quick Dash Sum Score: 27/55  Dorothy Ingram Calculated Score 36%      Time in:   15:00  Timed Treatment: 44 Total Treatment Time:  44  Time out: 15:44  ________________________________________________________________________________________    Pain Level:    2/10  SUBJECTIVE:  Pt said she is doing well.  No issues after last session   OBJECTIVE:     Exercise/Equipment Resistance/Repetitions Other comments     Shrugs    Scap retraction x15  x15      Elbow Flex/Ext   Elbow sup/pro       Cane press  Cane Flexion x15  x15 1#     Wrist flex/ext       Wrist circles        Towel squeezes       Passive ER- sitting  Passive ER - supine    10\"x15    AAROM flexion  AAROM scaption  x20  x20      Pendulums        L/R      Up/down         Front raises x10        Table slides             Flexion            Scaption    5\"x15  5\"x10 HEP      Cane        Isometrics       Flex      Ext       Abd       IR     ER         Pulley        flexion   2 minutes       Wall ladder 5\"x5      Rows   Shoulder extension x15  x15 Yellow  Yellow                                  Other Therapeutic Activities:    HEP: [id number]        Manual Treatments:     Passive ER Passive flexion   GHJ mobs grade 1-2 for pain     18 minutes total     Modalities:      Test/Measurements:    125 degrees of passive flexion  70 degrees of ER at 90 degrees of abduction          ASSESSMENT:   Pt tolerated treatment well with minimal pain during TE and manual. Improved flexion an ER ROM noted this date. Pt continues to demonstrate good understanding of exercises and importance of form. AAROM progressed and AROM initiated this date. Flexion continued to progress well. Er improved this date as pt achieved 75 degrees at 90 degrees of abduction. PT continued to stress importance of passive ER at home. HEP updated. Treatment/Activity Tolerance:   [x]Patient tolerated treatment well [] Patient limited by fatique  [x]Patient limited by pain [] Patient limited by other medical complications  [] Other:     Goals:        Long term goals  Time Frame for Long term goals : 6 weeks  Long term goal 1: Pt will be independent and compliant with HEP   Long term goal 2: Pt will improve LUE strength to at least 4/5 gross strength  Long term goal 3: Pt will improve LUE ROM to within 15 degrees of opposite arm  Long term goal 4: Pt will be able to don/doff jacket with no increase in shoulder pain  Long term goal 5: Pt will improve Quick Dash score by at least 10 points to indicate significant improvement in function     Plan: [x] Continue per plan of care [] Alter current plan (see comments)   [] Plan of care initiated [] Hold pending MD visit [] Discharge      Plan for Next Session:      Re-Certification Due Date:         Signature:   Ana Johnson PT

## 2021-03-02 ENCOUNTER — TELEPHONE (OUTPATIENT)
Dept: ORTHOPEDIC SURGERY | Age: 66
End: 2021-03-02

## 2021-03-03 ENCOUNTER — HOSPITAL ENCOUNTER (OUTPATIENT)
Dept: PHYSICAL THERAPY | Age: 66
Setting detail: THERAPIES SERIES
Discharge: HOME | End: 2021-03-03
Payer: COMMERCIAL

## 2021-03-03 PROCEDURE — 97140 MANUAL THERAPY 1/> REGIONS: CPT

## 2021-03-03 PROCEDURE — 97110 THERAPEUTIC EXERCISES: CPT

## 2021-03-03 NOTE — FLOWSHEET NOTE
Other Therapeutic Activities:    HEP: [id number]        Manual Treatments:     Passive ER  Passive flexion   GHJ mobs grade 1-2 for pain     18 minutes total     Modalities:      Test/Measurements:     152 degrees of passive flexion  82 degrees of ER at 90 degrees of abduction          ASSESSMENT:   Pt has made progress over this treatment period and is progressing toward all goals. Pt is able to achieve 82 degrees of ER at 90 degrees of abduction and 152 degrees of passive flexion. Strengthening has been slowly progressed per patient tolerance. Manual focused on improving L GHJ ROM and mobility. Pt has reported significant decrease in pain with active movements. PT recommending pt continue to perform therapy 2x/week for 5 weeks to continue to progress strength, normalize ROM, and maximize function with LUE at home and in the community     Treatment/Activity Tolerance:   [x]Patient tolerated treatment well [] Patient limited by fatique  [x]Patient limited by pain [] Patient limited by other medical complications  [] Other:     Goals:        Long term goals  Time Frame for Long term goals : 6 weeks  Long term goal 1: Pt will be independent and compliant with HEP   Long term goal 2: Pt will improve LUE strength to at least 4/5 gross strength  Long term goal 3: Pt will improve LUE ROM to within 15 degrees of opposite arm  Long term goal 4: Pt will be able to don/doff jacket with no increase in shoulder pain  Long term goal 5: Pt will improve Quick Dash score by at least 10 points to indicate significant improvement in function     Plan: [x] Continue per plan of care [] Alter current plan (see comments)   [] Plan of care initiated [] Hold pending MD visit [] Discharge      Plan for Next Session:      Re-Certification Due Date:         Signature:   Heron Land PT

## 2021-03-05 ENCOUNTER — HOSPITAL ENCOUNTER (OUTPATIENT)
Dept: PHYSICAL THERAPY | Age: 66
Setting detail: THERAPIES SERIES
Discharge: HOME | End: 2021-03-05
Payer: COMMERCIAL

## 2021-03-05 PROCEDURE — 97140 MANUAL THERAPY 1/> REGIONS: CPT

## 2021-03-05 PROCEDURE — 97110 THERAPEUTIC EXERCISES: CPT

## 2021-03-05 NOTE — FLOWSHEET NOTE
[facility], [facility], [address]  Phone: [phone]  Fax [phone]    Physical Therapy Daily Treatment Note    Date:  3/5/2021    Patient Name:  Nery Greer    :  1955  MRN: [id number]  Restrictions/Precautions:    Medical/Treatment Diagnosis Information: Follow Up on the    · Diagnosis: S46.012A (ICD-10-CM) - Traumatic complete tear of left rotator cuff, initial encounter  Insurance/Certification information:  PT Insurance Information: [employer] Brady 150  Physician Information:  Referring Practitioner: Tyrone Steele  Plan of care signed (Y/N):  Y  Visit# / total visits:       G-Code (if applicable): Beth Nicolas Sum Score: 32/55   Quick Dash Calculated Score: 48%     Quick Dash Sum Score: 27/55  Beth Nicolas Calculated Score 36%      Time in:   15:00  Timed Treatment: 44 Total Treatment Time:  44  Time out: 15:44  ________________________________________________________________________________________    Pain Level:    2/10  SUBJECTIVE:  Pt said she is doing well.  No issues after last session   OBJECTIVE:     Exercise/Equipment Resistance/Repetitions Other comments     Shrugs    Scap retraction       Elbow Flex/Ext   Elbow sup/pro       Cane press  Cane Flexion x15  x15 2#  2#     Wrist flex/ext       Wrist circles        Towel squeezes       Passive ER- sitting  Passive ER - supine    10\"x15    AAROM flexion  AAROM scaption        Pendulums        L/R      Up/down         Front raises  Scaption  x15  x15        Table slides             Flexion            Scaption     HEP      Cane        Isometrics       Flex      Ext       Abd       IR     ER         Pulley        flexion   3 minutes       Wall ladder       Rows   Shoulder extension x15  x15 Yellow  Yellow      Sidelying ER   Sidelying abduction   Sidelying horizontal abduction  x15  x15  x15                           Other Therapeutic Activities:    HEP: [id number]        Manual Treatments:     Passive ER  Passive flexion   GHJ mobs grade 1-2 for pain     19 minutes total     Modalities:      Test/Measurements:     152 degrees of passive flexion  82 degrees of ER at 90 degrees of abduction          ASSESSMENT:   Pt tolerated treatment well with minimal pain during TE and manual. Improved flexion an ER ROM noted this date. Pt continues to demonstrate good understanding of exercises and importance of form. AAROM progressed and AROM initiated this date. Flexion continued to progress well. Er improved this date as pt achieved 80 degrees at 90 degrees of abduction. . HEP updated. Treatment/Activity Tolerance:   [x]Patient tolerated treatment well [] Patient limited by fatique  [x]Patient limited by pain [] Patient limited by other medical complications  [] Other:     Goals:        Long term goals  Time Frame for Long term goals : 6 weeks  Long term goal 1: Pt will be independent and compliant with HEP   Long term goal 2: Pt will improve LUE strength to at least 4/5 gross strength  Long term goal 3: Pt will improve LUE ROM to within 15 degrees of opposite arm  Long term goal 4: Pt will be able to don/doff jacket with no increase in shoulder pain  Long term goal 5: Pt will improve Quick Dash score by at least 10 points to indicate significant improvement in function     Plan: [x] Continue per plan of care [] Alter current plan (see comments)   [] Plan of care initiated [] Hold pending MD visit [] Discharge      Plan for Next Session:      Re-Certification Due Date:         Signature:   Danilo Pham , PT

## 2021-03-09 ENCOUNTER — TELEPHONE (OUTPATIENT)
Dept: INTERNAL MEDICINE CLINIC | Age: 66
End: 2021-03-09

## 2021-03-09 NOTE — TELEPHONE ENCOUNTER
----- Message from Alyse Olivares sent at 3/9/2021 12:43 PM EST -----  Subject: Message to Provider    QUESTIONS  Information for Provider? Patient is having congestion runny nose and   sneezing   had a rapid test and was positive for covid is getting our regular test   tomorrow wants to know what to do next.  ---------------------------------------------------------------------------  --------------  CALL BACK INFO  What is the best way for the office to contact you? OK to leave message on   voicemail  Preferred Call Back Phone Number? [phone]  ---------------------------------------------------------------------------  --------------  SCRIPT ANSWERS  Relationship to Patient?  Self

## 2021-03-10 ENCOUNTER — APPOINTMENT (OUTPATIENT)
Dept: PHYSICAL THERAPY | Age: 66
End: 2021-03-10
Payer: COMMERCIAL

## 2021-03-10 ENCOUNTER — OFFICE VISIT (OUTPATIENT)
Dept: PRIMARY CARE CLINIC | Age: 66
End: 2021-03-10
Payer: COMMERCIAL

## 2021-03-10 DIAGNOSIS — Z11.59 SCREENING FOR VIRAL DISEASE: Primary | ICD-10-CM

## 2021-03-10 PROCEDURE — 99211 OFF/OP EST MAY X REQ PHY/QHP: CPT | Performed by: NURSE PRACTITIONER

## 2021-03-10 NOTE — PROGRESS NOTES
Fawn Nevarez received a viral test for COVID-19. They were educated on isolation and quarantine as appropriate. For any symptoms, they were directed to seek care from their PCP, given contact information to establish with a doctor, directed to an urgent care or the emergency room.

## 2021-03-10 NOTE — PATIENT INSTRUCTIONS

## 2021-03-11 LAB — SARS-COV-2: DETECTED

## 2021-03-12 ENCOUNTER — APPOINTMENT (OUTPATIENT)
Dept: PHYSICAL THERAPY | Age: 66
End: 2021-03-12
Payer: COMMERCIAL

## 2021-03-17 ENCOUNTER — APPOINTMENT (OUTPATIENT)
Dept: PHYSICAL THERAPY | Age: 66
End: 2021-03-17
Payer: COMMERCIAL

## 2021-03-19 ENCOUNTER — APPOINTMENT (OUTPATIENT)
Dept: PHYSICAL THERAPY | Age: 66
End: 2021-03-19
Payer: COMMERCIAL

## 2021-11-05 ENCOUNTER — TELEPHONE (OUTPATIENT)
Dept: INTERNAL MEDICINE CLINIC | Age: 66
End: 2021-11-05

## 2021-11-05 NOTE — TELEPHONE ENCOUNTER
Left voice mail letting patient know that Jhoan Inman has agreed to be her PCP. Patient was last seen on 12/9/2020. Instructed patient to call office to schedule an appointment with Antonio Sharpe within the next 3 months.

## 2021-11-11 ENCOUNTER — OFFICE VISIT (OUTPATIENT)
Dept: INTERNAL MEDICINE CLINIC | Age: 66
End: 2021-11-11
Payer: COMMERCIAL

## 2021-11-11 VITALS
BODY MASS INDEX: 27.07 KG/M2 | WEIGHT: 148 LBS | HEART RATE: 102 BPM | TEMPERATURE: 98.1 F | DIASTOLIC BLOOD PRESSURE: 64 MMHG | OXYGEN SATURATION: 100 % | SYSTOLIC BLOOD PRESSURE: 138 MMHG

## 2021-11-11 DIAGNOSIS — Z86.19 H/O COLD SORES: ICD-10-CM

## 2021-11-11 DIAGNOSIS — Z13.1 DIABETES MELLITUS SCREENING: ICD-10-CM

## 2021-11-11 DIAGNOSIS — Z13.220 SCREENING CHOLESTEROL LEVEL: ICD-10-CM

## 2021-11-11 DIAGNOSIS — Z00.00 WELL ADULT EXAM: Primary | ICD-10-CM

## 2021-11-11 DIAGNOSIS — K13.70 MOUTH LESION: ICD-10-CM

## 2021-11-11 PROCEDURE — 99214 OFFICE O/P EST MOD 30 MIN: CPT | Performed by: NURSE PRACTITIONER

## 2021-11-11 RX ORDER — VALACYCLOVIR HYDROCHLORIDE 1 G/1
2000 TABLET, FILM COATED ORAL 2 TIMES DAILY PRN
Qty: 8 TABLET | Refills: 0 | Status: SHIPPED | OUTPATIENT
Start: 2021-11-11 | End: 2021-11-12

## 2021-11-11 RX ORDER — MUPIROCIN CALCIUM 20 MG/G
CREAM TOPICAL
Qty: 1 EACH | Refills: 2 | Status: SHIPPED | OUTPATIENT
Start: 2021-11-11 | End: 2021-12-11

## 2021-11-11 RX ORDER — PREDNISONE 20 MG/1
20 TABLET ORAL DAILY
Qty: 10 TABLET | Refills: 0 | Status: SHIPPED | OUTPATIENT
Start: 2021-11-11 | End: 2021-11-21

## 2021-11-11 SDOH — ECONOMIC STABILITY: FOOD INSECURITY: WITHIN THE PAST 12 MONTHS, THE FOOD YOU BOUGHT JUST DIDN'T LAST AND YOU DIDN'T HAVE MONEY TO GET MORE.: NEVER TRUE

## 2021-11-11 SDOH — ECONOMIC STABILITY: FOOD INSECURITY: WITHIN THE PAST 12 MONTHS, YOU WORRIED THAT YOUR FOOD WOULD RUN OUT BEFORE YOU GOT MONEY TO BUY MORE.: NEVER TRUE

## 2021-11-11 ASSESSMENT — ENCOUNTER SYMPTOMS
ABDOMINAL PAIN: 0
DIARRHEA: 0
BLOOD IN STOOL: 0
VOMITING: 0
CONSTIPATION: 0
NAUSEA: 0
COUGH: 0
EYE DISCHARGE: 0
SHORTNESS OF BREATH: 0
WHEEZING: 0

## 2021-11-11 ASSESSMENT — SOCIAL DETERMINANTS OF HEALTH (SDOH): HOW HARD IS IT FOR YOU TO PAY FOR THE VERY BASICS LIKE FOOD, HOUSING, MEDICAL CARE, AND HEATING?: NOT HARD AT ALL

## 2021-11-11 NOTE — PROGRESS NOTES
11/11/21    Leanna Kern  61 y.o.  female      Chief Complaint   Patient presents with    Blepharitis    Establish Care         HPI:   Pt presents to establish care with me following the departure of Dr. Mary Ellen Simmons from this practice. Earlier in the week she had said she was having red itchy eyes. She then realized that it occurred after using a new mascara. She stopped using the new mascara and problem resolved. She does seem to have a skin sensitivity sometimes with dermatitis, herpetic lesions about mouth and nose. She states that she intermittently has a sore on the inside of her nose. Right now she has some small blisters about the mouth that started at the same time the eyes were red. She admits she had switched toothpaste. Lab Results   Component Value Date    CHOL 233 (H) 11/02/2019    CHOL 218 (H) 11/06/2018    CHOL 220 (H) 12/23/2013     Lab Results   Component Value Date    TRIG 142 11/02/2019    TRIG 63 11/06/2018    TRIG 175 (H) 12/23/2013     Lab Results   Component Value Date     (H) 11/02/2019     (H) 11/06/2018    HDL 97 (H) 12/23/2013     Lab Results   Component Value Date    LDLCALC 102 (H) 11/02/2019    LDLCALC 103 (H) 11/06/2018    LDLCALC 88 12/23/2013     Lab Results   Component Value Date    LABVLDL 28 11/02/2019    LABVLDL 13 11/06/2018    LABVLDL 35 12/23/2013     No results found for: CHOLHDLRATIO     Lab Results   Component Value Date     12/09/2020    K 4.5 12/09/2020     12/09/2020    CO2 28 12/09/2020    BUN 13 12/09/2020    CREATININE 1.0 12/09/2020    GLUCOSE 107 (H) 12/09/2020    CALCIUM 9.8 12/09/2020    PROT 7.4 12/09/2020    LABALBU 4.5 12/09/2020    BILITOT 0.4 12/09/2020    ALKPHOS 60 12/09/2020    AST 18 12/09/2020    ALT 18 12/09/2020    LABGLOM 56 (A) 12/09/2020    GFRAA >60 12/09/2020    AGRATIO 1.6 12/09/2020    GLOB 2.9 12/09/2020       No results found for: LABA1C  No results found for: EAG       1.  Well adult exam  I have reviewed the patient's medical history in detail and updated the computerized patient record. Repeat well exam in one year  Update mammogram    2. H/O cold sores  Previously used valtrex prn with good success  Renewed script to keep on hand  - valACYclovir (VALTREX) 1 g tablet; Take 2 tablets by mouth 2 times daily as needed (herpetic outbreak)  Dispense: 8 tablet; Refill: 0    3. Screening cholesterol level  Update fasting labs  - Lipid Panel; Future    4. Diabetes mellitus screening  Update fasting labs  - Comprehensive Metabolic Panel; Future    5. Mouth lesions  With multiple manifestations of allergic reaction will treat with 5 days of prednisone  Bactroban ointment 3 times a day to lip lesions and nares lesion  Trial of OTC allergy medication  Also advised to be sure to change mascara brushes from what she used before eye irritation and to to back to toothpaste she was using before mouth lesions occurred. /64   Pulse 102   Temp 98.1 °F (36.7 °C)   Wt 148 lb (67.1 kg)   SpO2 100%   BMI 27.07 kg/m²     Current Outpatient Medications   Medication Sig Dispense Refill    valACYclovir (VALTREX) 1 g tablet Take 2 tablets by mouth 2 times daily as needed (herpetic outbreak) 8 tablet 0    predniSONE (DELTASONE) 20 MG tablet Take 1 tablet by mouth daily for 10 days 10 tablet 0    mupirocin (BACTROBAN) 2 % cream Apply 3 times daily. 1 each 2    ACZONE 7.5 % GEL MABLE EXT TO FACE QD      Apoaequorin (PREVAGEN PO) Take by mouth      Cyanocobalamin (VITAMIN B-12) 1000 MCG CR tablet Take 1,000 mcg by mouth daily      Cholecalciferol (VITAMIN D3) 2000 UNITS CAPS Take 1 capsule by mouth daily      calcium carbonate (OSCAL) 500 MG TABS tablet Take 500 mg by mouth daily      Lactobacillus (PROBIOTIC ACIDOPHILUS PO) Take 1 capsule by mouth daily      spironolactone (ALDACTONE) 100 MG tablet Take 1.5 tablets by mouth daily.  Takes one and a half daily FOR ACNE (Patient taking differently: Take 200 mg by mouth daily ) 45 tablet 0    Tretinoin, Facial Wrinkles, (TRETINOIN, EMOLLIENT,) 0.05 % CREA Apply  topically. Face  Indications: for acne       No current facility-administered medications for this visit. Social History     Socioeconomic History    Marital status:      Spouse name: Not on file    Number of children: Not on file    Years of education: Not on file    Highest education level: Not on file   Occupational History    Not on file   Tobacco Use    Smoking status: Former Smoker     Packs/day: 0.25     Years: 5.00     Pack years: 1.25     Quit date: 1998     Years since quittin.0    Smokeless tobacco: Never Used   Vaping Use    Vaping Use: Never used   Substance and Sexual Activity    Alcohol use: Yes     Alcohol/week: 7.0 standard drinks     Types: 7 Glasses of wine per week    Drug use: No    Sexual activity: Yes     Partners: Male   Other Topics Concern    Not on file   Social History Narrative    Not on file     Social Determinants of Health     Financial Resource Strain: Low Risk     Difficulty of Paying Living Expenses: Not hard at all   Food Insecurity: No Food Insecurity    Worried About Running Out of Food in the Last Year: Never true    [address] in the Last Year: Never true   Transportation Needs:     Lack of Transportation (Medical): Not on file    Lack of Transportation (Non-Medical):  Not on file   Physical Activity:     Days of Exercise per Week: Not on file    Minutes of Exercise per Session: Not on file   Stress:     Feeling of Stress : Not on file   Social Connections:     Frequency of Communication with Friends and Family: Not on file    Frequency of Social Gatherings with Friends and Family: Not on file    Attends Muslim Services: Not on file    Active Member of Clubs or Organizations: Not on file    Attends Club or Organization Meetings: Not on file    Marital Status: Not on file   Intimate Partner Violence:     Fear of Current or Ex-Partner: Not on file    Emotionally Abused: Not on file    Physically Abused: Not on file    Sexually Abused: Not on file   Housing Stability:     Unable to Pay for Housing in the Last Year: Not on file    Number of Places Lived in the Last Year: Not on file    Unstable Housing in the Last Year: Not on file       Family History   Problem Relation Age of Onset    Heart Disease Mother     Heart Disease Father     Heart Disease Brother     Heart Disease Brother     Heart Disease Brother     Heart Disease Brother     Heart Disease Brother     Cancer Sister        Past Medical History:   Diagnosis Date    Acne     Uterine fibroids affecting pregnancy 10/11       Review of Systems   Constitutional: Negative for fever. HENT: Positive for mouth sores. Negative for congestion. Eyes: Negative for discharge. Respiratory: Negative for cough, shortness of breath and wheezing. Cardiovascular: Negative for chest pain, palpitations and leg swelling. Gastrointestinal: Negative for abdominal pain, blood in stool, constipation, diarrhea, nausea and vomiting. Genitourinary: Negative for dysuria and hematuria. Musculoskeletal: Negative for myalgias. Skin: Negative for rash. Neurological: Negative for dizziness. Psychiatric/Behavioral: The patient is not nervous/anxious. Physical Exam  Constitutional:       General: She is not in acute distress. Appearance: She is not diaphoretic. HENT:      Right Ear: External ear normal.      Left Ear: External ear normal.      Mouth/Throat:      Pharynx: No oropharyngeal exudate. Comments: All along upper lip there is swelling noted, with small blisters. Nasal lesion on the inside of left nares  Eyes:      General: No scleral icterus. Right eye: No discharge. Left eye: No discharge. Neck:      Thyroid: No thyromegaly. Cardiovascular:      Rate and Rhythm: Normal rate and regular rhythm.       Heart sounds: Normal heart sounds. No murmur heard. No friction rub. No gallop. Pulmonary:      Effort: Pulmonary effort is normal.      Breath sounds: Normal breath sounds. No wheezing. Abdominal:      General: Bowel sounds are normal. There is no distension. Palpations: Abdomen is soft. Tenderness: There is no abdominal tenderness. Musculoskeletal:         General: No tenderness. Normal range of motion. Cervical back: Normal range of motion. Lymphadenopathy:      Cervical: No cervical adenopathy. Skin:     General: Skin is warm and dry. Findings: No erythema or rash. Neurological:      Mental Status: She is alert and oriented to person, place, and time.    Psychiatric:         Judgment: Judgment normal.                Favian Taylor, APRN - CNP

## 2022-01-19 ENCOUNTER — HOSPITAL ENCOUNTER (OUTPATIENT)
Age: 67
Discharge: HOME | End: 2022-01-19
Payer: COMMERCIAL

## 2022-01-19 DIAGNOSIS — Z13.220 SCREENING CHOLESTEROL LEVEL: ICD-10-CM

## 2022-01-19 DIAGNOSIS — Z13.1 DIABETES MELLITUS SCREENING: ICD-10-CM

## 2022-01-19 LAB
A/G RATIO: 2 (ref 1.1–2.2)
ALBUMIN SERPL-MCNC: 4.9 G/DL (ref 3.4–5)
ALP BLD-CCNC: 56 U/L (ref 40–129)
ALT SERPL-CCNC: 20 U/L (ref 10–40)
ANION GAP SERPL CALCULATED.3IONS-SCNC: 14 MMOL/L (ref 3–16)
AST SERPL-CCNC: 22 U/L (ref 15–37)
BILIRUB SERPL-MCNC: 0.7 MG/DL (ref 0–1)
BUN BLDV-MCNC: 10 MG/DL (ref 7–20)
CALCIUM SERPL-MCNC: 9.5 MG/DL (ref 8.3–10.6)
CHLORIDE BLD-SCNC: 99 MMOL/L (ref 99–110)
CHOLESTEROL, TOTAL: 226 MG/DL (ref 0–199)
CO2: 25 MMOL/L (ref 21–32)
CREAT SERPL-MCNC: 1 MG/DL (ref 0.6–1.2)
GFR AFRICAN AMERICAN: >60
GFR NON-AFRICAN AMERICAN: 56
GLUCOSE BLD-MCNC: 95 MG/DL (ref 70–99)
HDLC SERPL-MCNC: 113 MG/DL (ref 40–60)
LDL CHOLESTEROL CALCULATED: 99 MG/DL
POTASSIUM SERPL-SCNC: 4.6 MMOL/L (ref 3.5–5.1)
SODIUM BLD-SCNC: 138 MMOL/L (ref 136–145)
TOTAL PROTEIN: 7.3 G/DL (ref 6.4–8.2)
TRIGL SERPL-MCNC: 71 MG/DL (ref 0–150)
VLDLC SERPL CALC-MCNC: 14 MG/DL

## 2022-01-19 PROCEDURE — 80053 COMPREHEN METABOLIC PANEL: CPT

## 2022-01-19 PROCEDURE — 36415 COLL VENOUS BLD VENIPUNCTURE: CPT

## 2022-01-19 PROCEDURE — 80061 LIPID PANEL: CPT

## 2022-02-21 ENCOUNTER — HOSPITAL ENCOUNTER (OUTPATIENT)
Dept: WOMENS IMAGING | Age: 67
Discharge: HOME | End: 2022-02-21
Payer: COMMERCIAL

## 2022-02-21 DIAGNOSIS — Z12.31 ENCOUNTER FOR SCREENING MAMMOGRAM FOR BREAST CANCER: ICD-10-CM

## 2022-02-21 PROCEDURE — 77063 BREAST TOMOSYNTHESIS BI: CPT

## 2023-01-03 NOTE — PROGRESS NOTES
Christiano Orellana received a viral test for COVID-19. They were educated on isolation and quarantine as appropriate. For any symptoms, they were directed to seek care from their PCP, given contact information to establish with a doctor, directed to an urgent care or the emergency room. Ajay

## 2023-05-19 ENCOUNTER — OFFICE VISIT (OUTPATIENT)
Dept: INTERNAL MEDICINE CLINIC | Age: 68
End: 2023-05-19
Payer: COMMERCIAL

## 2023-05-19 VITALS
WEIGHT: 159 LBS | HEART RATE: 82 BPM | BODY MASS INDEX: 29.26 KG/M2 | HEIGHT: 62 IN | OXYGEN SATURATION: 98 % | TEMPERATURE: 97.3 F | SYSTOLIC BLOOD PRESSURE: 160 MMHG | DIASTOLIC BLOOD PRESSURE: 72 MMHG

## 2023-05-19 DIAGNOSIS — Z00.00 WELL ADULT EXAM: Primary | ICD-10-CM

## 2023-05-19 DIAGNOSIS — Z78.0 MENOPAUSE: ICD-10-CM

## 2023-05-19 DIAGNOSIS — I10 HYPERTENSION, UNSPECIFIED TYPE: ICD-10-CM

## 2023-05-19 PROCEDURE — 3078F DIAST BP <80 MM HG: CPT | Performed by: NURSE PRACTITIONER

## 2023-05-19 PROCEDURE — 3077F SYST BP >= 140 MM HG: CPT | Performed by: NURSE PRACTITIONER

## 2023-05-19 PROCEDURE — 99397 PER PM REEVAL EST PAT 65+ YR: CPT | Performed by: NURSE PRACTITIONER

## 2023-05-19 RX ORDER — PROGESTERONE 100 MG/1
CAPSULE ORAL DAILY
COMMUNITY

## 2023-05-19 RX ORDER — LISINOPRIL 10 MG/1
10 TABLET ORAL DAILY
Qty: 30 TABLET | Refills: 3 | Status: SHIPPED | OUTPATIENT
Start: 2023-05-19

## 2023-05-19 SDOH — ECONOMIC STABILITY: FOOD INSECURITY: WITHIN THE PAST 12 MONTHS, YOU WORRIED THAT YOUR FOOD WOULD RUN OUT BEFORE YOU GOT MONEY TO BUY MORE.: NEVER TRUE

## 2023-05-19 SDOH — ECONOMIC STABILITY: FOOD INSECURITY: WITHIN THE PAST 12 MONTHS, THE FOOD YOU BOUGHT JUST DIDN'T LAST AND YOU DIDN'T HAVE MONEY TO GET MORE.: NEVER TRUE

## 2023-05-19 SDOH — ECONOMIC STABILITY: INCOME INSECURITY: HOW HARD IS IT FOR YOU TO PAY FOR THE VERY BASICS LIKE FOOD, HOUSING, MEDICAL CARE, AND HEATING?: NOT HARD AT ALL

## 2023-05-19 SDOH — ECONOMIC STABILITY: HOUSING INSECURITY
IN THE LAST 12 MONTHS, WAS THERE A TIME WHEN YOU DID NOT HAVE A STEADY PLACE TO SLEEP OR SLEPT IN A SHELTER (INCLUDING NOW)?: NO

## 2023-05-19 ASSESSMENT — ENCOUNTER SYMPTOMS
WHEEZING: 0
BLOOD IN STOOL: 0
ABDOMINAL PAIN: 0
VOMITING: 0
EYE DISCHARGE: 0
NAUSEA: 0
CONSTIPATION: 0
SHORTNESS OF BREATH: 0
DIARRHEA: 0
COUGH: 0

## 2023-05-19 ASSESSMENT — PATIENT HEALTH QUESTIONNAIRE - PHQ9
SUM OF ALL RESPONSES TO PHQ QUESTIONS 1-9: 0
SUM OF ALL RESPONSES TO PHQ9 QUESTIONS 1 & 2: 0
SUM OF ALL RESPONSES TO PHQ QUESTIONS 1-9: 0
SUM OF ALL RESPONSES TO PHQ QUESTIONS 1-9: 0
2. FEELING DOWN, DEPRESSED OR HOPELESS: 0
SUM OF ALL RESPONSES TO PHQ QUESTIONS 1-9: 0
1. LITTLE INTEREST OR PLEASURE IN DOING THINGS: 0

## 2023-05-19 NOTE — PROGRESS NOTES
05/19/23    Marylu Kern  79 y.o.  female      Chief Complaint   Patient presents with    H&P         HPI:   Pt presents for well adult physical. Last ov was 2021. Since last visit she has started HT with Adriel Reynolds at [place]. SHe also stopped aldactone which she was taking for acne. With the cessation of aldactone her BP is elevated. She is up to date on eye and dental exams. Needs mammogram and DEXA  Colonoscopy will be due in Dec.  She is due for Pnuemo 20      Lab Results   Component Value Date    CHOL 226 (H) 01/19/2022    CHOL 233 (H) 11/02/2019    CHOL 218 (H) 11/06/2018     Lab Results   Component Value Date    TRIG 71 01/19/2022    TRIG 142 11/02/2019    TRIG 63 11/06/2018     Lab Results   Component Value Date     (H) 01/19/2022     (H) 11/02/2019     (H) 11/06/2018     Lab Results   Component Value Date    LDLCALC 99 01/19/2022    LDLCALC 102 (H) 11/02/2019    LDLCALC 103 (H) 11/06/2018     Lab Results   Component Value Date    LABVLDL 14 01/19/2022    LABVLDL 28 11/02/2019    LABVLDL 13 11/06/2018     No results found for: CHOLHDLRATIO     Lab Results   Component Value Date     01/19/2022    K 4.6 01/19/2022    CL 99 01/19/2022    CO2 25 01/19/2022    BUN 10 01/19/2022    CREATININE 1.0 01/19/2022    GLUCOSE 95 01/19/2022    CALCIUM 9.5 01/19/2022    PROT 7.3 01/19/2022    LABALBU 4.9 01/19/2022    BILITOT 0.7 01/19/2022    ALKPHOS 56 01/19/2022    AST 22 01/19/2022    ALT 20 01/19/2022    LABGLOM 56 (A) 01/19/2022    GFRAA >60 01/19/2022    AGRATIO 2.0 01/19/2022    GLOB 2.9 12/09/2020       No results found for: LABA1C  No results found for: EAG       1. Well adult exam  I have reviewed the patient's medical history in detail and updated the computerized patient record. She will update Penumo next month at return ov    2.  Menopause  Obtain DEXA and MAMMOGRAM  Copy of orders provided  - DEXA BONE DENSITY AXIAL SKELETON; Future  -  MAMMOGRAPHY    3.

## 2023-06-09 ENCOUNTER — TELEPHONE (OUTPATIENT)
Dept: INTERNAL MEDICINE CLINIC | Age: 68
End: 2023-06-09

## 2023-06-21 ENCOUNTER — HOSPITAL ENCOUNTER (OUTPATIENT)
Dept: WOMENS IMAGING | Age: 68
Discharge: HOME | End: 2023-06-21
Payer: COMMERCIAL

## 2023-06-21 DIAGNOSIS — Z78.0 MENOPAUSE: ICD-10-CM

## 2023-06-21 PROCEDURE — 77080 DXA BONE DENSITY AXIAL: CPT

## 2023-06-21 PROCEDURE — 77063 BREAST TOMOSYNTHESIS BI: CPT

## 2023-06-30 ENCOUNTER — OFFICE VISIT (OUTPATIENT)
Dept: INTERNAL MEDICINE CLINIC | Age: 68
End: 2023-06-30
Payer: COMMERCIAL

## 2023-06-30 VITALS
TEMPERATURE: 97 F | DIASTOLIC BLOOD PRESSURE: 64 MMHG | WEIGHT: 155 LBS | BODY MASS INDEX: 28.35 KG/M2 | SYSTOLIC BLOOD PRESSURE: 120 MMHG

## 2023-06-30 DIAGNOSIS — Z12.9 SCREENING FOR MALIGNANT NEOPLASM: ICD-10-CM

## 2023-06-30 DIAGNOSIS — Z23 IMMUNIZATION DUE: ICD-10-CM

## 2023-06-30 DIAGNOSIS — E78.5 HYPERLIPIDEMIA, UNSPECIFIED HYPERLIPIDEMIA TYPE: ICD-10-CM

## 2023-06-30 DIAGNOSIS — I10 HYPERTENSION, UNSPECIFIED TYPE: Primary | ICD-10-CM

## 2023-06-30 PROCEDURE — 90677 PCV20 VACCINE IM: CPT | Performed by: NURSE PRACTITIONER

## 2023-06-30 PROCEDURE — 3078F DIAST BP <80 MM HG: CPT | Performed by: NURSE PRACTITIONER

## 2023-06-30 PROCEDURE — 1123F ACP DISCUSS/DSCN MKR DOCD: CPT | Performed by: NURSE PRACTITIONER

## 2023-06-30 PROCEDURE — 3074F SYST BP LT 130 MM HG: CPT | Performed by: NURSE PRACTITIONER

## 2023-06-30 PROCEDURE — 99213 OFFICE O/P EST LOW 20 MIN: CPT | Performed by: NURSE PRACTITIONER

## 2023-06-30 PROCEDURE — 90471 IMMUNIZATION ADMIN: CPT | Performed by: NURSE PRACTITIONER

## 2023-06-30 ASSESSMENT — ENCOUNTER SYMPTOMS
COUGH: 0
EYE DISCHARGE: 0
NAUSEA: 0
SHORTNESS OF BREATH: 0
DIARRHEA: 0
WHEEZING: 0
BLOOD IN STOOL: 0
VOMITING: 0
CONSTIPATION: 0
ABDOMINAL PAIN: 0

## 2023-08-16 DIAGNOSIS — I10 HYPERTENSION, UNSPECIFIED TYPE: ICD-10-CM

## 2023-08-16 RX ORDER — LISINOPRIL 10 MG/1
10 TABLET ORAL DAILY
Qty: 30 TABLET | Refills: 3 | Status: SHIPPED | OUTPATIENT
Start: 2023-08-16

## 2023-08-16 NOTE — TELEPHONE ENCOUNTER
Refill request for lisinopril medication.      Name of Pharmacy-       Last visit - 6/30/23     Pending visit - 12/29/23    Last refill -8/14/23      Medication Contract signed -   Last Oarrs ran-         Additional Comments

## 2023-12-14 ENCOUNTER — TELEPHONE (OUTPATIENT)
Dept: INTERNAL MEDICINE CLINIC | Age: 68
End: 2023-12-14

## 2023-12-14 DIAGNOSIS — Z86.19 H/O COLD SORES: ICD-10-CM

## 2023-12-14 RX ORDER — VALACYCLOVIR HYDROCHLORIDE 1 G/1
2000 TABLET, FILM COATED ORAL 2 TIMES DAILY PRN
Qty: 14 TABLET | Refills: 0 | Status: SHIPPED | OUTPATIENT
Start: 2023-12-14 | End: 2023-12-21

## 2023-12-14 NOTE — TELEPHONE ENCOUNTER
Patient is needing medicine for cold sores that Simi Costa has prescribed in the past. Was wanting to know if she can get some called in and she is having a cold sore now.          Flex Echavarria     [phone]

## 2023-12-29 ENCOUNTER — OFFICE VISIT (OUTPATIENT)
Dept: INTERNAL MEDICINE CLINIC | Age: 68
End: 2023-12-29
Payer: MEDICARE

## 2023-12-29 VITALS
DIASTOLIC BLOOD PRESSURE: 70 MMHG | SYSTOLIC BLOOD PRESSURE: 144 MMHG | WEIGHT: 149 LBS | TEMPERATURE: 97.4 F | BODY MASS INDEX: 27.25 KG/M2

## 2023-12-29 DIAGNOSIS — I10 PRIMARY HYPERTENSION: Primary | ICD-10-CM

## 2023-12-29 DIAGNOSIS — E55.9 VITAMIN D DEFICIENCY: ICD-10-CM

## 2023-12-29 DIAGNOSIS — E78.2 MIXED HYPERLIPIDEMIA: ICD-10-CM

## 2023-12-29 DIAGNOSIS — R73.01 IFG (IMPAIRED FASTING GLUCOSE): ICD-10-CM

## 2023-12-29 PROCEDURE — 3077F SYST BP >= 140 MM HG: CPT | Performed by: NURSE PRACTITIONER

## 2023-12-29 PROCEDURE — G8399 PT W/DXA RESULTS DOCUMENT: HCPCS | Performed by: NURSE PRACTITIONER

## 2023-12-29 PROCEDURE — 1123F ACP DISCUSS/DSCN MKR DOCD: CPT | Performed by: NURSE PRACTITIONER

## 2023-12-29 PROCEDURE — 1090F PRES/ABSN URINE INCON ASSESS: CPT | Performed by: NURSE PRACTITIONER

## 2023-12-29 PROCEDURE — G8484 FLU IMMUNIZE NO ADMIN: HCPCS | Performed by: NURSE PRACTITIONER

## 2023-12-29 PROCEDURE — 99214 OFFICE O/P EST MOD 30 MIN: CPT | Performed by: NURSE PRACTITIONER

## 2023-12-29 PROCEDURE — G8427 DOCREV CUR MEDS BY ELIG CLIN: HCPCS | Performed by: NURSE PRACTITIONER

## 2023-12-29 PROCEDURE — 3078F DIAST BP <80 MM HG: CPT | Performed by: NURSE PRACTITIONER

## 2023-12-29 PROCEDURE — 1036F TOBACCO NON-USER: CPT | Performed by: NURSE PRACTITIONER

## 2023-12-29 PROCEDURE — G8419 CALC BMI OUT NRM PARAM NOF/U: HCPCS | Performed by: NURSE PRACTITIONER

## 2023-12-29 PROCEDURE — 90694 VACC AIIV4 NO PRSRV 0.5ML IM: CPT | Performed by: NURSE PRACTITIONER

## 2023-12-29 PROCEDURE — G0008 ADMIN INFLUENZA VIRUS VAC: HCPCS | Performed by: NURSE PRACTITIONER

## 2023-12-29 PROCEDURE — 3017F COLORECTAL CA SCREEN DOC REV: CPT | Performed by: NURSE PRACTITIONER

## 2023-12-29 RX ORDER — ROSUVASTATIN CALCIUM 5 MG/1
5 TABLET, COATED ORAL DAILY
Qty: 90 TABLET | Refills: 3 | Status: SHIPPED | OUTPATIENT
Start: 2023-12-29

## 2023-12-29 NOTE — PROGRESS NOTES
Musculoskeletal:  Negative for myalgias. Skin:  Negative for rash. Neurological:  Negative for dizziness. Psychiatric/Behavioral:  The patient is not nervous/anxious. Physical Exam  Constitutional:       General: She is not in acute distress. Appearance: She is not diaphoretic. HENT:      Right Ear: External ear normal.      Left Ear: External ear normal.      Mouth/Throat:      Pharynx: No oropharyngeal exudate. Eyes:      General: No scleral icterus. Right eye: No discharge. Left eye: No discharge. Neck:      Thyroid: No thyromegaly. Cardiovascular:      Rate and Rhythm: Normal rate and regular rhythm. Heart sounds: Normal heart sounds. No murmur heard. No friction rub. No gallop. Pulmonary:      Effort: Pulmonary effort is normal.      Breath sounds: Normal breath sounds. No wheezing. Abdominal:      General: Bowel sounds are normal. There is no distension. Palpations: Abdomen is soft. Tenderness: There is no abdominal tenderness. Musculoskeletal:         General: No tenderness. Normal range of motion. Cervical back: Normal range of motion. Lymphadenopathy:      Cervical: No cervical adenopathy. Skin:     General: Skin is warm and dry. Findings: No erythema or rash. Neurological:      Mental Status: She is alert and oriented to person, place, and time. Psychiatric:         Judgment: Judgment normal.           1. Primary hypertension  Reviewed lab results with patient  She requests to return for a recheck before adding additional med  Continue lisinopril 10 mg daily  - Comprehensive Metabolic Panel, Fasting; Future    2. Mixed hyperlipidemia  Reviewed lab results with patient  Begin crestor 5 mg daily  - Lipid, Fasting; Future  - rosuvastatin (CRESTOR) 5 MG tablet; Take 1 tablet by mouth daily  Dispense: 90 tablet; Refill: 3    3.  Vitamin D deficiency  Reviewed lab results with patient  Cont supplement  - Vitamin D 25 Hydroxy;

## 2023-12-30 ENCOUNTER — PATIENT MESSAGE (OUTPATIENT)
Dept: INTERNAL MEDICINE CLINIC | Age: 68
End: 2023-12-30

## 2023-12-30 DIAGNOSIS — Z86.19 H/O COLD SORES: Primary | ICD-10-CM

## 2024-01-02 RX ORDER — ACYCLOVIR 50 MG/G
OINTMENT TOPICAL
Qty: 1 EACH | Refills: 2 | Status: SHIPPED | OUTPATIENT
Start: 2024-01-02 | End: 2024-01-09

## 2024-01-02 NOTE — TELEPHONE ENCOUNTER
From: Neelam Kern  To: Rosa Geiger  Sent: 12/30/2023 1:03 PM EST  Subject: Cold sore medicine     Isaias Lee,     Just checking to see if you are going to order a prescription cream to apply to cold sores or if it is purchased over the counter?     Thank you,  Neelam Kern

## 2024-01-09 DIAGNOSIS — I10 HYPERTENSION, UNSPECIFIED TYPE: ICD-10-CM

## 2024-01-09 RX ORDER — LISINOPRIL 10 MG/1
10 TABLET ORAL DAILY
Qty: 90 TABLET | Refills: 3 | Status: SHIPPED | OUTPATIENT
Start: 2024-01-09

## 2024-01-09 NOTE — TELEPHONE ENCOUNTER
Refill request for LISINOPRIL medication.     Name of Pharmacy- [facility]      Last visit - 12/29/23     Pending visit - 1/24/24    Last refill -10/18/23      Medication Contract signed -   Last Oarrs ran-         Additional Comments

## 2024-01-24 ENCOUNTER — TELEPHONE (OUTPATIENT)
Dept: INTERNAL MEDICINE CLINIC | Age: 69
End: 2024-01-24

## 2024-01-24 NOTE — TELEPHONE ENCOUNTER
----- Message from Janice Booth sent at 1/24/2024 12:15 PM EST -----  Subject: Message to Provider    QUESTIONS  Information for Provider? PT has cough and chest congestion, sinus   drainage and has high blood pressure and needs to know what over the   counter medication she can take for this ,please call asap with any   information  ---------------------------------------------------------------------------  --------------  CALL BACK INFO  [phone]; OK to leave message on voicemail  ---------------------------------------------------------------------------  --------------  SCRIPT ANSWERS  Relationship to Patient? Self

## 2024-01-27 ASSESSMENT — PATIENT HEALTH QUESTIONNAIRE - PHQ9
SUM OF ALL RESPONSES TO PHQ9 QUESTIONS 1 & 2: 0
2. FEELING DOWN, DEPRESSED OR HOPELESS: NOT AT ALL
1. LITTLE INTEREST OR PLEASURE IN DOING THINGS: 0
SUM OF ALL RESPONSES TO PHQ QUESTIONS 1-9: 0
SUM OF ALL RESPONSES TO PHQ9 QUESTIONS 1 & 2: 0
1. LITTLE INTEREST OR PLEASURE IN DOING THINGS: NOT AT ALL
SUM OF ALL RESPONSES TO PHQ QUESTIONS 1-9: 0
SUM OF ALL RESPONSES TO PHQ QUESTIONS 1-9: 0
2. FEELING DOWN, DEPRESSED OR HOPELESS: 0
SUM OF ALL RESPONSES TO PHQ QUESTIONS 1-9: 0

## 2024-01-30 ENCOUNTER — OFFICE VISIT (OUTPATIENT)
Dept: INTERNAL MEDICINE CLINIC | Age: 69
End: 2024-01-30
Payer: MEDICARE

## 2024-01-30 VITALS
TEMPERATURE: 97 F | SYSTOLIC BLOOD PRESSURE: 132 MMHG | WEIGHT: 145 LBS | BODY MASS INDEX: 26.52 KG/M2 | DIASTOLIC BLOOD PRESSURE: 60 MMHG

## 2024-01-30 DIAGNOSIS — E78.2 MIXED HYPERLIPIDEMIA: ICD-10-CM

## 2024-01-30 DIAGNOSIS — R73.01 IFG (IMPAIRED FASTING GLUCOSE): ICD-10-CM

## 2024-01-30 DIAGNOSIS — J40 BRONCHITIS: Primary | ICD-10-CM

## 2024-01-30 DIAGNOSIS — I10 HYPERTENSION, UNSPECIFIED TYPE: ICD-10-CM

## 2024-01-30 PROCEDURE — G8427 DOCREV CUR MEDS BY ELIG CLIN: HCPCS | Performed by: NURSE PRACTITIONER

## 2024-01-30 PROCEDURE — 3017F COLORECTAL CA SCREEN DOC REV: CPT | Performed by: NURSE PRACTITIONER

## 2024-01-30 PROCEDURE — G8399 PT W/DXA RESULTS DOCUMENT: HCPCS | Performed by: NURSE PRACTITIONER

## 2024-01-30 PROCEDURE — 1123F ACP DISCUSS/DSCN MKR DOCD: CPT | Performed by: NURSE PRACTITIONER

## 2024-01-30 PROCEDURE — 99214 OFFICE O/P EST MOD 30 MIN: CPT | Performed by: NURSE PRACTITIONER

## 2024-01-30 PROCEDURE — 3078F DIAST BP <80 MM HG: CPT | Performed by: NURSE PRACTITIONER

## 2024-01-30 PROCEDURE — 1036F TOBACCO NON-USER: CPT | Performed by: NURSE PRACTITIONER

## 2024-01-30 PROCEDURE — 3075F SYST BP GE 130 - 139MM HG: CPT | Performed by: NURSE PRACTITIONER

## 2024-01-30 PROCEDURE — G8419 CALC BMI OUT NRM PARAM NOF/U: HCPCS | Performed by: NURSE PRACTITIONER

## 2024-01-30 PROCEDURE — G8484 FLU IMMUNIZE NO ADMIN: HCPCS | Performed by: NURSE PRACTITIONER

## 2024-01-30 PROCEDURE — 1090F PRES/ABSN URINE INCON ASSESS: CPT | Performed by: NURSE PRACTITIONER

## 2024-01-30 RX ORDER — PREDNISONE 20 MG/1
20 TABLET ORAL 2 TIMES DAILY
Qty: 10 TABLET | Refills: 0 | Status: SHIPPED | OUTPATIENT
Start: 2024-01-30 | End: 2024-02-04

## 2024-01-30 RX ORDER — AZITHROMYCIN 250 MG/1
TABLET, FILM COATED ORAL
Qty: 1 PACKET | Refills: 0 | Status: SHIPPED | OUTPATIENT
Start: 2024-01-30

## 2024-01-30 RX ORDER — ALBUTEROL SULFATE 90 UG/1
2 AEROSOL, METERED RESPIRATORY (INHALATION) 4 TIMES DAILY PRN
Qty: 18 G | Refills: 0 | Status: SHIPPED | OUTPATIENT
Start: 2024-01-30

## 2024-01-30 ASSESSMENT — ENCOUNTER SYMPTOMS
SHORTNESS OF BREATH: 0
NAUSEA: 0
COUGH: 1
VOMITING: 0
CONSTIPATION: 0
BLOOD IN STOOL: 0
DIARRHEA: 0
EYE DISCHARGE: 0
ABDOMINAL PAIN: 0
WHEEZING: 0

## 2024-01-30 NOTE — PROGRESS NOTES
01/30/24    Neelam Kern  68 y.o.  female      Chief Complaint   Patient presents with    Hypertension         HPI:   Pt presents for evaluation of HTN.    HTN:  In Dec BP was 144/70 and 150/70  She requested to return for a repeat check before adding any additional med. Lisinopril 10 mg  Today--132/60  She also brings a home BP ;pg with recent values (over the past week) of 107-118/64-71    HLD: pt decided to not take crestor at this point but to try some supplements.      She also complains of coughing over the past week, wheezing and producing colored sputum.  She has been taking mucinex.    Lab Results   Component Value Date    CHOL 232 (H) 12/21/2023    CHOL 226 (H) 01/19/2022    CHOL 233 (H) 11/02/2019     Lab Results   Component Value Date    TRIG 74 12/21/2023    TRIG 71 01/19/2022    TRIG 142 11/02/2019     Lab Results   Component Value Date     (H) 12/21/2023     (H) 01/19/2022     (H) 11/02/2019     Lab Results   Component Value Date    LDLCALC 110 (H) 12/21/2023    LDLCALC 99 01/19/2022    LDLCALC 102 (H) 11/02/2019     No results found for: \"VLDL\"  No results found for: \"CHOLHDLRATIO\"     Lab Results   Component Value Date     12/21/2023    K 4.5 12/21/2023     12/21/2023    CO2 27 12/21/2023    BUN 11 12/21/2023    CREATININE 0.9 12/21/2023    GLUCOSE 102 (H) 12/21/2023    CALCIUM 9.3 12/21/2023    PROT 7.2 12/21/2023    LABALBU 4.4 12/21/2023    BILITOT 0.8 12/21/2023    ALKPHOS 53 12/21/2023    AST 21 12/21/2023    ALT 15 12/21/2023    LABGLOM >60 12/21/2023    GFRAA >60 01/19/2022    AGRATIO 1.6 12/21/2023    GLOB 2.9 12/09/2020       No results found for: \"LABA1C\"  No results found for: \"EAG\"     Review of Systems   Constitutional:  Negative for fever.   HENT:  Negative for congestion.    Eyes:  Negative for discharge.   Respiratory:  Positive for cough (colored sputum). Negative for shortness of breath and wheezing.    Cardiovascular:  Negative for chest

## 2024-01-30 NOTE — PATIENT INSTRUCTIONS
Increase fluids, rest and vitamin C  Use nasal sinus rinse 2 times a day.  Gargle with salt water at least 2 times a day.  Robitussin cough syrup if needed  May use ibuprofen or tylenol for comfort

## 2024-02-21 DIAGNOSIS — J40 BRONCHITIS: ICD-10-CM

## 2024-02-21 RX ORDER — ALBUTEROL SULFATE 90 UG/1
AEROSOL, METERED RESPIRATORY (INHALATION)
Qty: 18 G | Refills: 0 | Status: SHIPPED | OUTPATIENT
Start: 2024-02-21

## 2024-02-21 NOTE — TELEPHONE ENCOUNTER
Refill request for PROVENTIL INHALER medication.     Name of Pharmacy-       Last visit - 1/30/24     Pending visit - 7/2/24    Last refill -1/30/24      Medication Contract signed -   Last Oarrs ran-         Additional Comments

## 2024-06-26 ENCOUNTER — TELEPHONE (OUTPATIENT)
Dept: INTERNAL MEDICINE CLINIC | Age: 69
End: 2024-06-26

## 2024-06-26 ENCOUNTER — HOSPITAL ENCOUNTER (OUTPATIENT)
Age: 69
Discharge: HOME | End: 2024-06-26
Payer: MEDICARE

## 2024-06-26 DIAGNOSIS — E55.9 VITAMIN D DEFICIENCY: ICD-10-CM

## 2024-06-26 DIAGNOSIS — E78.2 MIXED HYPERLIPIDEMIA: ICD-10-CM

## 2024-06-26 DIAGNOSIS — R73.01 IFG (IMPAIRED FASTING GLUCOSE): ICD-10-CM

## 2024-06-26 LAB
25(OH)D3 SERPL-MCNC: 66.1 NG/ML
ALBUMIN SERPL-MCNC: 4.5 G/DL (ref 3.4–5)
ALBUMIN/GLOB SERPL: 1.6 {RATIO} (ref 1.1–2.2)
ALP SERPL-CCNC: 53 U/L (ref 40–129)
ALT SERPL-CCNC: 17 U/L (ref 10–40)
ANION GAP SERPL CALCULATED.3IONS-SCNC: 12 MMOL/L (ref 3–16)
AST SERPL-CCNC: 22 U/L (ref 15–37)
BILIRUB SERPL-MCNC: 0.7 MG/DL (ref 0–1)
BUN SERPL-MCNC: 12 MG/DL (ref 7–20)
CALCIUM SERPL-MCNC: 9.4 MG/DL (ref 8.3–10.6)
CHLORIDE SERPL-SCNC: 103 MMOL/L (ref 99–110)
CHOLEST SERPL-MCNC: 219 MG/DL (ref 0–199)
CO2 SERPL-SCNC: 26 MMOL/L (ref 21–32)
CREAT SERPL-MCNC: 0.9 MG/DL (ref 0.6–1.2)
GFR SERPLBLD CREATININE-BSD FMLA CKD-EPI: 70 ML/MIN/{1.73_M2}
GLUCOSE P FAST SERPL-MCNC: 91 MG/DL (ref 70–99)
HDLC SERPL-MCNC: 109 MG/DL (ref 40–60)
LDL CHOLESTEROL: 93 MG/DL
POTASSIUM SERPL-SCNC: 4.7 MMOL/L (ref 3.5–5.1)
PROT SERPL-MCNC: 7.3 G/DL (ref 6.4–8.2)
SODIUM SERPL-SCNC: 141 MMOL/L (ref 136–145)
TRIGL SERPL-MCNC: 83 MG/DL (ref 0–150)
VLDLC SERPL CALC-MCNC: 17 MG/DL

## 2024-06-26 PROCEDURE — 80053 COMPREHEN METABOLIC PANEL: CPT

## 2024-06-26 PROCEDURE — 83036 HEMOGLOBIN GLYCOSYLATED A1C: CPT

## 2024-06-26 PROCEDURE — 82306 VITAMIN D 25 HYDROXY: CPT

## 2024-06-26 PROCEDURE — 80061 LIPID PANEL: CPT

## 2024-06-26 PROCEDURE — 36415 COLL VENOUS BLD VENIPUNCTURE: CPT

## 2024-06-26 NOTE — TELEPHONE ENCOUNTER
I left voicemail to call back to reschedule 7/2/2024 appointment with Rosa.  Appointment needs to be at least 40 minutes as it is supposed to be a Welcome to Medicare visit and an EKG

## 2024-06-27 LAB
EST. AVERAGE GLUCOSE BLD GHB EST-MCNC: 102.5 MG/DL
HBA1C MFR BLD: 5.2 %

## 2024-08-06 ENCOUNTER — OFFICE VISIT (OUTPATIENT)
Dept: INTERNAL MEDICINE CLINIC | Age: 69
End: 2024-08-06
Payer: MEDICARE

## 2024-08-06 VITALS
BODY MASS INDEX: 25.58 KG/M2 | WEIGHT: 139 LBS | HEIGHT: 62 IN | SYSTOLIC BLOOD PRESSURE: 130 MMHG | TEMPERATURE: 97 F | DIASTOLIC BLOOD PRESSURE: 60 MMHG

## 2024-08-06 DIAGNOSIS — Z00.00 INITIAL MEDICARE ANNUAL WELLNESS VISIT: Primary | ICD-10-CM

## 2024-08-06 DIAGNOSIS — E78.2 MIXED HYPERLIPIDEMIA: ICD-10-CM

## 2024-08-06 DIAGNOSIS — I10 HYPERTENSION, UNSPECIFIED TYPE: ICD-10-CM

## 2024-08-06 DIAGNOSIS — R73.01 IFG (IMPAIRED FASTING GLUCOSE): ICD-10-CM

## 2024-08-06 DIAGNOSIS — E55.9 VITAMIN D DEFICIENCY: ICD-10-CM

## 2024-08-06 PROCEDURE — 1123F ACP DISCUSS/DSCN MKR DOCD: CPT | Performed by: NURSE PRACTITIONER

## 2024-08-06 PROCEDURE — G0438 PPPS, INITIAL VISIT: HCPCS | Performed by: NURSE PRACTITIONER

## 2024-08-06 PROCEDURE — G0403 EKG FOR INITIAL PREVENT EXAM: HCPCS | Performed by: NURSE PRACTITIONER

## 2024-08-06 PROCEDURE — 3075F SYST BP GE 130 - 139MM HG: CPT | Performed by: NURSE PRACTITIONER

## 2024-08-06 PROCEDURE — 3017F COLORECTAL CA SCREEN DOC REV: CPT | Performed by: NURSE PRACTITIONER

## 2024-08-06 PROCEDURE — 3078F DIAST BP <80 MM HG: CPT | Performed by: NURSE PRACTITIONER

## 2024-08-06 RX ORDER — AMPICILLIN TRIHYDRATE 250 MG
1 CAPSULE ORAL DAILY
COMMUNITY

## 2024-08-06 SDOH — ECONOMIC STABILITY: INCOME INSECURITY: HOW HARD IS IT FOR YOU TO PAY FOR THE VERY BASICS LIKE FOOD, HOUSING, MEDICAL CARE, AND HEATING?: NOT HARD AT ALL

## 2024-08-06 SDOH — ECONOMIC STABILITY: FOOD INSECURITY: WITHIN THE PAST 12 MONTHS, THE FOOD YOU BOUGHT JUST DIDN'T LAST AND YOU DIDN'T HAVE MONEY TO GET MORE.: NEVER TRUE

## 2024-08-06 SDOH — ECONOMIC STABILITY: FOOD INSECURITY: WITHIN THE PAST 12 MONTHS, YOU WORRIED THAT YOUR FOOD WOULD RUN OUT BEFORE YOU GOT MONEY TO BUY MORE.: NEVER TRUE

## 2024-08-06 ASSESSMENT — LIFESTYLE VARIABLES
HOW OFTEN DURING THE LAST YEAR HAVE YOU HAD A FEELING OF GUILT OR REMORSE AFTER DRINKING: NEVER
HAVE YOU OR SOMEONE ELSE BEEN INJURED AS A RESULT OF YOUR DRINKING: NO
HOW OFTEN DO YOU HAVE A DRINK CONTAINING ALCOHOL: 4 OR MORE TIMES A WEEK
HOW OFTEN DURING THE LAST YEAR HAVE YOU FAILED TO DO WHAT WAS NORMALLY EXPECTED FROM YOU BECAUSE OF DRINKING: NEVER
HOW OFTEN DURING THE LAST YEAR HAVE YOU NEEDED AN ALCOHOLIC DRINK FIRST THING IN THE MORNING TO GET YOURSELF GOING AFTER A NIGHT OF HEAVY DRINKING: NEVER
HOW OFTEN DURING THE LAST YEAR HAVE YOU FOUND THAT YOU WERE NOT ABLE TO STOP DRINKING ONCE YOU HAD STARTED: NEVER
HOW MANY STANDARD DRINKS CONTAINING ALCOHOL DO YOU HAVE ON A TYPICAL DAY: 1 OR 2
HOW OFTEN DURING THE LAST YEAR HAVE YOU BEEN UNABLE TO REMEMBER WHAT HAPPENED THE NIGHT BEFORE BECAUSE YOU HAD BEEN DRINKING: NEVER
HAS A RELATIVE, FRIEND, DOCTOR, OR ANOTHER HEALTH PROFESSIONAL EXPRESSED CONCERN ABOUT YOUR DRINKING OR SUGGESTED YOU CUT DOWN: NO

## 2024-08-06 ASSESSMENT — PATIENT HEALTH QUESTIONNAIRE - PHQ9
SUM OF ALL RESPONSES TO PHQ QUESTIONS 1-9: 0
SUM OF ALL RESPONSES TO PHQ9 QUESTIONS 1 & 2: 0
SUM OF ALL RESPONSES TO PHQ QUESTIONS 1-9: 0
2. FEELING DOWN, DEPRESSED OR HOPELESS: NOT AT ALL
SUM OF ALL RESPONSES TO PHQ QUESTIONS 1-9: 0
1. LITTLE INTEREST OR PLEASURE IN DOING THINGS: NOT AT ALL
SUM OF ALL RESPONSES TO PHQ QUESTIONS 1-9: 0

## 2024-08-06 NOTE — PATIENT INSTRUCTIONS
Patient Education     Myoglobin (Urine)  Does this test have other names?  Urine myoglobin  What is this test?  This test measures a protein called myoglobin in your urine. The test can help find out if your muscle tissue has been injured.   Myoglobin is found in your heart and skeletal muscles. There it captures oxygen that muscle cells use for energy. But when you have a heart attack or severe muscle damage, myoglobin is released into your blood. Once there, it can rise to dangerous levels in your body.   Your kidneys filter your blood for myoglobin so that it can be removed from your body in your urine. But too much myoglobin can overwhelm the kidneys and lead to kidney failure. In some cases, this test can help your healthcare provider find the hazard and protect your kidney health.   Why do I need this test?  You may need this test if your healthcare provider believes that you have a severe muscle injury. Symptoms vary, depending on the cause of muscle damage, but may include:   · Fever  · Fast heart rate  · Nausea and vomiting  · Belly pain  · Muscle pain, stiffness, or cramping  You may also have this test if you have serious muscle pain, weakness, and dark brown or reddish urine. These are possible signs of rhabdomyolysis, a potentially life-threatening muscle condition that can cause your kidneys to fail. Some cases are tied to the use of statins, a group of cholesterol-lowering medicines, or with intense exercises.   If your myoglobin level rises too high, you may have to get IV (intravenous) fluids or other treatments to help flush the extra myoglobin out of your body. This test will help your provider find out if your injuries need treatment right away.   What other tests might I have along with this test?  You may also need other tests. These include:   · Complete blood count, or CBC, including a differential and platelet count  · Blood urea nitrogen, or BUN; creatinine; and routine electrolytes,  including potassium  · Calcium, phosphate, albumin, and uric acid  · Creatine phosphokinase (CPK)   What do my test results mean?  Test results may vary depending on your age, gender, health history, the method used for the test, and other things. Your test results may not mean you have a problem. Ask your healthcare provider what your test results mean for you.    Normal results show little or no myoglobin in your urine.  If your results are higher, it may mean you have muscle injury. These are some possible causes of muscle injury:   · Coma or another situation in which you don't move  · Surgery  · Certain infections  · Poisons and certain medicines  · Inherited conditions that cause muscle problems  · Abnormally strenuous exercise  · Crushing injury, such as from a car accident  How is this test done?  This test is done with a urine sample. Your healthcare provider will tell you how to collect it.   Does this test pose any risks?  This test poses no known risks.  What might affect my test results?  Other factors aren't likely to affect your results.  How do I get ready for this test?  You don't need to get ready for this test. But be sure your healthcare provider knows about all medicines, herbs, vitamins, and supplements you are taking. This includes medicines that don't need a prescription and any illegal drugs you may use.     [employer] last reviewed this educational content on 9/1/2020  © 9163-9676 [employer]. All rights reserved. This information is not intended as a substitute for professional medical care. Always follow your healthcare professional's instructions.         Patient Education     Blood in the Urine    Blood in the urine (hematuria) has many possible causes. If it occurs after an injury (such as a car accident or fall), it is most often a sign of bruising to the kidney or bladder. Common causes of blood in the urine include urinary tract infections, kidney stones, inflammation,  tumors, or certain other diseases of the kidney or bladder. Menstruation can cause blood to appear in the urine sample, but it is not coming from the urinary tract.   If only a tiny (trace) amount of blood is present, it will show up on the urine test, even though the urine may be yellow and not pink or red. This may occur with any of the above conditions, as well as heavy exercise or high fever. In this case, your healthcare provider may want to repeat the urine test on another day. This will show if there is still blood in the urine. If there is, then other tests can be done to find out the cause.   Home care  Follow these home care guidelines:  · If your urine does not look bloody (pink, brown, or red) then you don't need to restrict your activity in any way.  · If you can see blood in your urine, rest and don't do any strenuous activity until your next exam. Don't use aspirin, blood thinners, or anti-platelet or anti-inflammatory medicines. These include ibuprofen and naproxen. These thin the blood and may increase bleeding. Call your healthcare provider to talk about using these medicines.  Follow-up care  Follow up with your healthcare provider, or as advised. If you were injured and had blood in your urine, you should have a repeat urine test in 1 to 2 days. Contact your provider for this test.   A radiologist will review any X-rays that were taken. You will be told of any new findings that may affect your care.   When to seek medical advice  Call your healthcare provider right away if any of these occur:  · Bright red blood or blood clots in the urine (if you did not have this before)  · Weakness, dizziness or fainting  · New groin, belly, or back pain  · Fever of 100.4ºF (38ºC) or higher, or as directed by your provider  · Repeated vomiting  · Bleeding from the nose or gums or easy bruising  [employer] last reviewed this educational content on 9/1/2019  © 9452-2581 [employer]. All rights  reserved. This information is not intended as a substitute for professional medical care. Always follow your healthcare professional's instructions.            ask your healthcare professional. [employer] disclaims any warranty or liability for your use of this information.      Personalized Preventive Plan for Neelam Kern - 8/6/2024  Medicare offers a range of preventive health benefits. Some of the tests and screenings are paid in full while other may be subject to a deductible, co-insurance, and/or copay.    Some of these benefits include a comprehensive review of your medical history including lifestyle, illnesses that may run in your family, and various assessments and screenings as appropriate.    After reviewing your medical record and screening and assessments performed today your provider may have ordered immunizations, labs, imaging, and/or referrals for you.  A list of these orders (if applicable) as well as your Preventive Care list are included within your After Visit Summary for your review.    Other Preventive Recommendations:    A preventive eye exam performed by an eye specialist is recommended every 1-2 years to screen for glaucoma; cataracts, macular degeneration, and other eye disorders.  A preventive dental visit is recommended every 6 months.  Try to get at least 150 minutes of exercise per week or 10,000 steps per day on a pedometer .  Order or download the FREE \"Exercise & Physical Activity: Your Everyday Guide\" from [employer] [place] [employer]. Call 1-749.800.6041 or search [employer] [place] [employer] online.  You need 7274-8405 mg of calcium and 8299-4584 IU of vitamin D per day. It is possible to meet your calcium requirement with diet alone, but a vitamin D supplement is usually necessary to meet this goal.  When exposed to the sun, use a sunscreen that protects against both UVA and UVB radiation with an SPF of 30 or greater. Reapply every 2 to 3 hours or after sweating, drying off with a towel, or swimming.  Always wear a seat belt when traveling in a car. Always wear a helmet when riding a bicycle or motorcycle.

## 2024-08-06 NOTE — PROGRESS NOTES
HLD: crestor 5 mg ordered but she didn't take it.  She did concentrate on lifestyle changes  Today: Total C 219; ; LDL 93; Trigs 83  Per ASCVD low to mod statin  Lab Results   Component Value Date    CHOL 232 (H) 12/21/2023    CHOL 226 (H) 01/19/2022    CHOL 233 (H) 11/02/2019     Lab Results   Component Value Date    TRIG 74 12/21/2023    TRIG 71 01/19/2022    TRIG 142 11/02/2019     Lab Results   Component Value Date     (H) 06/26/2024     (H) 12/21/2023     (H) 01/19/2022     Lab Results   Component Value Date    LDL 93 06/26/2024     (H) 12/21/2023    LDL 99 01/19/2022     Lab Results   Component Value Date    VLDL 17 06/26/2024    VLDL 15 12/21/2023    VLDL 14 01/19/2022     No results found for: \"CHOLHDLRATIO\"        HTN: lisinopril 10 mg     VIT D Deficiency: level 66 on current supplement    Medicare Annual Wellness Visit    Neelam Kern is here for Medicare AWV    Assessment & Plan   Initial Medicare annual wellness visit  I have reviewed the patient's medical history in detail and updated the computerized patient record.   -     EKG - Welcome to Medicare (IPPE)-NSR  Update mammogram  Create ADs with signature notarized and copy brought for chart.    Hypertension, unspecified type  Reviewed lab results with patient  Monitor  Continue lisinopril 10 mg daily    Mixed hyperlipidemia  Reviewed lab results with patient  Discussed the components of the lipid panel as well as the total patient  She will continue to hold off on crestor, watch diet and we will continue to monitor  -     Lipid, Fasting; Future    IFG (impaired fasting glucose)  Reviewed lab results with patient  Glucose and A1C are both WNL  -     Comprehensive Metabolic Panel, Fasting; Future    Vitamin D deficiency  Reviewed lab results with patient  Continue current supplement  -     Vitamin D 25 Hydroxy; Future    Update labs in 6 months, one week before scheduled OV.   Repeat AMW in 12

## 2024-09-18 ENCOUNTER — HOSPITAL ENCOUNTER (OUTPATIENT)
Dept: WOMENS IMAGING | Age: 69
Discharge: HOME | End: 2024-09-18
Payer: MEDICARE

## 2024-09-18 VITALS — HEIGHT: 62 IN | WEIGHT: 139 LBS | BODY MASS INDEX: 25.58 KG/M2

## 2024-09-18 DIAGNOSIS — Z12.31 ENCOUNTER FOR SCREENING MAMMOGRAM FOR MALIGNANT NEOPLASM OF BREAST: ICD-10-CM

## 2024-09-18 PROCEDURE — 77063 BREAST TOMOSYNTHESIS BI: CPT

## 2024-12-23 NOTE — PROGRESS NOTES
MD Georgette   Coenzyme Q10 (COQ10) 200 MG CAPS Take 1 capsule by mouth daily   Yes Georgette Clements MD   Red Yeast Rice Extract (RED YEAST RICE PO) Take by mouth   Yes Georgette Clements MD   lisinopril (PRINIVIL;ZESTRIL) 10 MG tablet TAKE 1 TABLET BY MOUTH EVERY DAY 1/9/24  Yes Rosa Geiger APRN - CNP   progesterone (PROMETRIUM) 100 MG CAPS capsule Take by mouth daily   Yes Georgette Clements MD   ACZONE 7.5 % GEL MABLE EXT TO FACE QD 9/25/20  Yes Georgette Clements MD   Cyanocobalamin (VITAMIN B-12) 1000 MCG CR tablet Take 1 tablet by mouth daily   Yes Georgette Clements MD   Cholecalciferol (VITAMIN D3) 2000 UNITS CAPS Take 1 capsule by mouth daily   Yes Georgette Clements MD   calcium carbonate (OSCAL) 500 MG TABS tablet Take 1 tablet by mouth daily   Yes Georgette Clements MD   Lactobacillus (PROBIOTIC ACIDOPHILUS PO) Take 1 capsule by mouth daily   Yes Georegtte Clements MD   Tretinoin, Facial Wrinkles, (TRETINOIN, EMOLLIENT,) 0.05 % CREA Apply  topically. Face  Indications: for acne   Yes Georgette Clements MD     REVIEW OF SYSTEMS  The following systems were reviewed and revealed the following in addition to any already discussed in the HPI:  Review of Systems   Constitutional:  Negative for fatigue and fever.   HENT:  Positive for tinnitus. Negative for congestion, ear pain, postnasal drip, rhinorrhea and sneezing.    Eyes:  Negative for pain and visual disturbance.   Respiratory:  Negative for cough and shortness of breath.    Cardiovascular:  Negative for chest pain.   Gastrointestinal:  Negative for nausea and vomiting.   Endocrine: Negative.    Genitourinary: Negative.    Musculoskeletal:  Negative for neck pain and neck stiffness.   Skin:  Negative for rash.   Neurological:  Negative for dizziness and headaches.      PHYSICAL EXAM  BP (!) 148/69   Pulse 89   Ht 1.575 m (5' 2\")   Wt 63.7 kg (140 lb 6.4 oz)   BMI 25.68 kg/m²   GENERAL: No Acute Distress, Alert and

## 2024-12-26 ENCOUNTER — OFFICE VISIT (OUTPATIENT)
Dept: ENT CLINIC | Age: 69
End: 2024-12-26
Payer: MEDICARE

## 2024-12-26 ENCOUNTER — PROCEDURE VISIT (OUTPATIENT)
Dept: AUDIOLOGY | Age: 69
End: 2024-12-26
Payer: MEDICARE

## 2024-12-26 VITALS
BODY MASS INDEX: 25.83 KG/M2 | HEART RATE: 89 BPM | SYSTOLIC BLOOD PRESSURE: 148 MMHG | WEIGHT: 140.4 LBS | HEIGHT: 62 IN | DIASTOLIC BLOOD PRESSURE: 69 MMHG

## 2024-12-26 DIAGNOSIS — H93.13 TINNITUS OF BOTH EARS: ICD-10-CM

## 2024-12-26 DIAGNOSIS — H90.3 SENSORINEURAL HEARING LOSS, BILATERAL: Primary | ICD-10-CM

## 2024-12-26 DIAGNOSIS — H61.23 BILATERAL IMPACTED CERUMEN: ICD-10-CM

## 2024-12-26 DIAGNOSIS — H90.3 SENSORINEURAL HEARING LOSS (SNHL) OF BOTH EARS: Primary | ICD-10-CM

## 2024-12-26 PROCEDURE — 99204 OFFICE O/P NEW MOD 45 MIN: CPT | Performed by: STUDENT IN AN ORGANIZED HEALTH CARE EDUCATION/TRAINING PROGRAM

## 2024-12-26 PROCEDURE — 1159F MED LIST DOCD IN RCRD: CPT | Performed by: STUDENT IN AN ORGANIZED HEALTH CARE EDUCATION/TRAINING PROGRAM

## 2024-12-26 PROCEDURE — 1090F PRES/ABSN URINE INCON ASSESS: CPT | Performed by: STUDENT IN AN ORGANIZED HEALTH CARE EDUCATION/TRAINING PROGRAM

## 2024-12-26 PROCEDURE — G8419 CALC BMI OUT NRM PARAM NOF/U: HCPCS | Performed by: STUDENT IN AN ORGANIZED HEALTH CARE EDUCATION/TRAINING PROGRAM

## 2024-12-26 PROCEDURE — 92567 TYMPANOMETRY: CPT | Performed by: AUDIOLOGIST

## 2024-12-26 PROCEDURE — 3077F SYST BP >= 140 MM HG: CPT | Performed by: STUDENT IN AN ORGANIZED HEALTH CARE EDUCATION/TRAINING PROGRAM

## 2024-12-26 PROCEDURE — 69210 REMOVE IMPACTED EAR WAX UNI: CPT | Performed by: STUDENT IN AN ORGANIZED HEALTH CARE EDUCATION/TRAINING PROGRAM

## 2024-12-26 PROCEDURE — 1036F TOBACCO NON-USER: CPT | Performed by: STUDENT IN AN ORGANIZED HEALTH CARE EDUCATION/TRAINING PROGRAM

## 2024-12-26 PROCEDURE — 1160F RVW MEDS BY RX/DR IN RCRD: CPT | Performed by: STUDENT IN AN ORGANIZED HEALTH CARE EDUCATION/TRAINING PROGRAM

## 2024-12-26 PROCEDURE — 1123F ACP DISCUSS/DSCN MKR DOCD: CPT | Performed by: STUDENT IN AN ORGANIZED HEALTH CARE EDUCATION/TRAINING PROGRAM

## 2024-12-26 PROCEDURE — 92557 COMPREHENSIVE HEARING TEST: CPT | Performed by: AUDIOLOGIST

## 2024-12-26 PROCEDURE — 3078F DIAST BP <80 MM HG: CPT | Performed by: STUDENT IN AN ORGANIZED HEALTH CARE EDUCATION/TRAINING PROGRAM

## 2024-12-26 PROCEDURE — G8484 FLU IMMUNIZE NO ADMIN: HCPCS | Performed by: STUDENT IN AN ORGANIZED HEALTH CARE EDUCATION/TRAINING PROGRAM

## 2024-12-26 PROCEDURE — G8427 DOCREV CUR MEDS BY ELIG CLIN: HCPCS | Performed by: STUDENT IN AN ORGANIZED HEALTH CARE EDUCATION/TRAINING PROGRAM

## 2024-12-26 PROCEDURE — 3017F COLORECTAL CA SCREEN DOC REV: CPT | Performed by: STUDENT IN AN ORGANIZED HEALTH CARE EDUCATION/TRAINING PROGRAM

## 2024-12-26 PROCEDURE — G8399 PT W/DXA RESULTS DOCUMENT: HCPCS | Performed by: STUDENT IN AN ORGANIZED HEALTH CARE EDUCATION/TRAINING PROGRAM

## 2024-12-26 RX ORDER — ELECTROLYTES/DEXTROSE
SOLUTION, ORAL ORAL
COMMUNITY

## 2024-12-26 RX ORDER — IBUPROFEN 200 MG
500 CAPSULE ORAL DAILY
COMMUNITY

## 2024-12-26 RX ORDER — TRETINOIN 0.25 MG/G
CREAM TOPICAL
COMMUNITY
Start: 2024-11-27

## 2024-12-26 ASSESSMENT — ENCOUNTER SYMPTOMS
EYE PAIN: 0
RHINORRHEA: 0
COUGH: 0
VOMITING: 0
NAUSEA: 0
SHORTNESS OF BREATH: 0

## 2024-12-26 NOTE — PROGRESS NOTES
Neelam Kern   1955, 69 y.o. female   [id number]       Referring Provider: Luiz Lemus DO  Referral Type: In an order in Epic    Reason for Visit: Evaluation of the cause of disorders of hearing, tinnitus, or balance.    ADULT AUDIOLOGIC EVALUATION      Neelam Kern is a 69 y.o. female seen today, 12/26/2024 , for an initial audiologic evaluation.  Patient was seen by Luiz Lemus DO following today's evaluation. Patient was alone.    AUDIOLOGIC AND OTHER PERTINENT MEDICAL HISTORY:      Neelam Kern noted tinnitus and family history of hearing loss.  Patient reports constant bilateral tinnitus, left worse than right, which affects her sleep at night.  She stated she will wake up around 4am and hear radio static with voices preventing her from falling asleep.  Patient's hearing was previously tested and was told she had a mild hearing loss.  Patient has a family history of hearing loss occurring later in life.    Neelam Kern denied otalgia, aural fullness, dizziness, history of occupational/recreational noise exposure, history of head trauma, and history of ear surgery.    Date: 12/26/2024     IMPRESSIONS:      Normal middle ear pressure and compliance, bilaterally.  Abnormal hearing sensitivity, bilaterally, which can affect difficult listening environments.  Word understanding was excellent presented at elevated sensation levels, bilaterally.  Hearing aids are recommended at this time.  Discussed tinnitus management techniques such as a sound machine at night.  Follow medical recommendations of Luiz Lemus DO.     ASSESSMENT AND FINDINGS:     Otoscopy revealed: occluding cerumen in the left ear and clear ear canal right.  Cerumen removed without incidence from the left ear via curette.    RIGHT EAR:  Hearing Sensitivity: Normal hearing sensitivity to a moderately severe sensorineural hearing loss from 250-8000 Hz  Speech Recognition Threshold: 25 dB HL  Word Recognition:Excellent (100%), based

## 2025-01-09 ENCOUNTER — TELEPHONE (OUTPATIENT)
Dept: INTERNAL MEDICINE CLINIC | Age: 70
End: 2025-01-09

## 2025-01-09 NOTE — TELEPHONE ENCOUNTER
----- Message from Donna MCDANIELS sent at 1/9/2025  1:41 PM EST -----  Regarding: [facility] Appointment Request  [facility] Appointment Request    Patient needs appointment for [facility] Appointment Type: Existing Condition Follow Up.    Patient Requested Dates(s): February 17, 2025  Patient Requested Time: 10 :00 am and 2:00 pm  Provider Name:  Rosa Geiger APRN - CNP    Reason for Appointment Request: Other - Reschedule  --------------------------------------------------------------------------------------------------------------------------    Relationship to Patient: Self     Call Back Information: OK to leave message on [employer]  Preferred Call Back Number: Phone - [phone]    Additional Information: Patient called in because she wants to reschedule her February 11, 2025. And she also open for a different doctor if her provider will not be available on February 17, 2025.

## 2025-02-17 DIAGNOSIS — R73.01 IFG (IMPAIRED FASTING GLUCOSE): ICD-10-CM

## 2025-02-17 DIAGNOSIS — E55.9 VITAMIN D DEFICIENCY: ICD-10-CM

## 2025-02-17 DIAGNOSIS — E78.2 MIXED HYPERLIPIDEMIA: ICD-10-CM

## 2025-02-17 LAB
25(OH)D3 SERPL-MCNC: 69.5 NG/ML
ALBUMIN SERPL-MCNC: 4.5 G/DL (ref 3.4–5)
ALBUMIN/GLOB SERPL: 2.1 {RATIO} (ref 1.1–2.2)
ALP SERPL-CCNC: 46 U/L (ref 40–129)
ALT SERPL-CCNC: 26 U/L (ref 10–40)
ANION GAP SERPL CALCULATED.3IONS-SCNC: 9 MMOL/L (ref 3–16)
AST SERPL-CCNC: 26 U/L (ref 15–37)
BILIRUB SERPL-MCNC: 0.5 MG/DL (ref 0–1)
BUN SERPL-MCNC: 15 MG/DL (ref 7–20)
CALCIUM SERPL-MCNC: 9.3 MG/DL (ref 8.3–10.6)
CHLORIDE SERPL-SCNC: 100 MMOL/L (ref 99–110)
CHOLEST SERPL-MCNC: 214 MG/DL (ref 0–199)
CO2 SERPL-SCNC: 28 MMOL/L (ref 21–32)
CREAT SERPL-MCNC: 0.9 MG/DL (ref 0.6–1.2)
GFR SERPLBLD CREATININE-BSD FMLA CKD-EPI: 70 ML/MIN/{1.73_M2}
GLUCOSE P FAST SERPL-MCNC: 108 MG/DL (ref 70–99)
HDLC SERPL-MCNC: 101 MG/DL (ref 40–60)
LDL CHOLESTEROL: 103 MG/DL
POTASSIUM SERPL-SCNC: 4.5 MMOL/L (ref 3.5–5.1)
PROT SERPL-MCNC: 6.6 G/DL (ref 6.4–8.2)
SODIUM SERPL-SCNC: 137 MMOL/L (ref 136–145)
TRIGL SERPL-MCNC: 49 MG/DL (ref 0–150)
VLDLC SERPL CALC-MCNC: 10 MG/DL

## 2025-02-26 DIAGNOSIS — I10 HYPERTENSION, UNSPECIFIED TYPE: ICD-10-CM

## 2025-02-26 RX ORDER — LISINOPRIL 10 MG/1
10 TABLET ORAL DAILY
Qty: 90 TABLET | Refills: 3 | Status: SHIPPED | OUTPATIENT
Start: 2025-02-26

## 2025-02-26 NOTE — TELEPHONE ENCOUNTER
Refill request for LISINOPRIL medication.     Name of Pharmacy- [facility]      Last visit - 8/6/24     Pending visit - 3/11/25    Last refill -11/27/24      Medication Contract signed -   Last Oarrs ran-         Additional Comments

## 2025-03-10 SDOH — ECONOMIC STABILITY: TRANSPORTATION INSECURITY
IN THE PAST 12 MONTHS, HAS THE LACK OF TRANSPORTATION KEPT YOU FROM MEDICAL APPOINTMENTS OR FROM GETTING MEDICATIONS?: NO

## 2025-03-10 SDOH — ECONOMIC STABILITY: FOOD INSECURITY: WITHIN THE PAST 12 MONTHS, THE FOOD YOU BOUGHT JUST DIDN'T LAST AND YOU DIDN'T HAVE MONEY TO GET MORE.: NEVER TRUE

## 2025-03-10 SDOH — ECONOMIC STABILITY: FOOD INSECURITY: WITHIN THE PAST 12 MONTHS, YOU WORRIED THAT YOUR FOOD WOULD RUN OUT BEFORE YOU GOT MONEY TO BUY MORE.: NEVER TRUE

## 2025-03-10 SDOH — ECONOMIC STABILITY: INCOME INSECURITY: IN THE LAST 12 MONTHS, WAS THERE A TIME WHEN YOU WERE NOT ABLE TO PAY THE MORTGAGE OR RENT ON TIME?: NO

## 2025-03-10 SDOH — ECONOMIC STABILITY: TRANSPORTATION INSECURITY
IN THE PAST 12 MONTHS, HAS LACK OF TRANSPORTATION KEPT YOU FROM MEETINGS, WORK, OR FROM GETTING THINGS NEEDED FOR DAILY LIVING?: NO

## 2025-03-10 ASSESSMENT — PATIENT HEALTH QUESTIONNAIRE - PHQ9
SUM OF ALL RESPONSES TO PHQ QUESTIONS 1-9: 0
1. LITTLE INTEREST OR PLEASURE IN DOING THINGS: NOT AT ALL
2. FEELING DOWN, DEPRESSED OR HOPELESS: NOT AT ALL
SUM OF ALL RESPONSES TO PHQ9 QUESTIONS 1 & 2: 0
1. LITTLE INTEREST OR PLEASURE IN DOING THINGS: NOT AT ALL
2. FEELING DOWN, DEPRESSED OR HOPELESS: NOT AT ALL

## 2025-03-11 ENCOUNTER — OFFICE VISIT (OUTPATIENT)
Dept: INTERNAL MEDICINE CLINIC | Age: 70
End: 2025-03-11
Payer: MEDICARE

## 2025-03-11 VITALS
SYSTOLIC BLOOD PRESSURE: 118 MMHG | TEMPERATURE: 97.2 F | OXYGEN SATURATION: 97 % | HEART RATE: 82 BPM | WEIGHT: 128 LBS | BODY MASS INDEX: 23.41 KG/M2 | DIASTOLIC BLOOD PRESSURE: 50 MMHG

## 2025-03-11 DIAGNOSIS — E78.2 MIXED HYPERLIPIDEMIA: ICD-10-CM

## 2025-03-11 DIAGNOSIS — I10 HYPERTENSION, UNSPECIFIED TYPE: Primary | ICD-10-CM

## 2025-03-11 DIAGNOSIS — R73.01 IFG (IMPAIRED FASTING GLUCOSE): ICD-10-CM

## 2025-03-11 DIAGNOSIS — E55.9 VITAMIN D DEFICIENCY: ICD-10-CM

## 2025-03-11 PROCEDURE — 1036F TOBACCO NON-USER: CPT | Performed by: NURSE PRACTITIONER

## 2025-03-11 PROCEDURE — 99214 OFFICE O/P EST MOD 30 MIN: CPT | Performed by: NURSE PRACTITIONER

## 2025-03-11 PROCEDURE — 3078F DIAST BP <80 MM HG: CPT | Performed by: NURSE PRACTITIONER

## 2025-03-11 PROCEDURE — 1090F PRES/ABSN URINE INCON ASSESS: CPT | Performed by: NURSE PRACTITIONER

## 2025-03-11 PROCEDURE — 3074F SYST BP LT 130 MM HG: CPT | Performed by: NURSE PRACTITIONER

## 2025-03-11 PROCEDURE — G8420 CALC BMI NORM PARAMETERS: HCPCS | Performed by: NURSE PRACTITIONER

## 2025-03-11 PROCEDURE — 3017F COLORECTAL CA SCREEN DOC REV: CPT | Performed by: NURSE PRACTITIONER

## 2025-03-11 PROCEDURE — G8399 PT W/DXA RESULTS DOCUMENT: HCPCS | Performed by: NURSE PRACTITIONER

## 2025-03-11 PROCEDURE — 1159F MED LIST DOCD IN RCRD: CPT | Performed by: NURSE PRACTITIONER

## 2025-03-11 PROCEDURE — 1123F ACP DISCUSS/DSCN MKR DOCD: CPT | Performed by: NURSE PRACTITIONER

## 2025-03-11 PROCEDURE — G8427 DOCREV CUR MEDS BY ELIG CLIN: HCPCS | Performed by: NURSE PRACTITIONER

## 2025-03-11 SDOH — ECONOMIC STABILITY: FOOD INSECURITY: WITHIN THE PAST 12 MONTHS, THE FOOD YOU BOUGHT JUST DIDN'T LAST AND YOU DIDN'T HAVE MONEY TO GET MORE.: NEVER TRUE

## 2025-03-11 SDOH — ECONOMIC STABILITY: FOOD INSECURITY: WITHIN THE PAST 12 MONTHS, YOU WORRIED THAT YOUR FOOD WOULD RUN OUT BEFORE YOU GOT MONEY TO BUY MORE.: NEVER TRUE

## 2025-03-11 ASSESSMENT — ENCOUNTER SYMPTOMS
DIARRHEA: 0
ABDOMINAL PAIN: 0
BLOOD IN STOOL: 0
CONSTIPATION: 0
SHORTNESS OF BREATH: 0
VOMITING: 0
COUGH: 0
NAUSEA: 0
EYE DISCHARGE: 0
WHEEZING: 0

## 2025-03-11 NOTE — PROGRESS NOTES
normal. There is no distension.      Palpations: Abdomen is soft.      Tenderness: There is no abdominal tenderness.   Musculoskeletal:         General: No tenderness. Normal range of motion.      Cervical back: Normal range of motion.   Lymphadenopathy:      Cervical: No cervical adenopathy.   Skin:     General: Skin is warm and dry.      Findings: No erythema or rash.   Neurological:      Mental Status: She is alert and oriented to person, place, and time.   Psychiatric:         Judgment: Judgment normal.           1. Hypertension, unspecified type  Reviewed lab results with patient  Continue lisinopril 10 mg daily    2. Mixed hyperlipidemia  Reviewed lab results with patient  Continue to follow healthy eating plan  Will recheck labs in 6 months    3. IFG (impaired fasting glucose)  Reviewed lab results with patient  Follow healthy eating plans  Recheck in 6 months    4. Vitamin D deficiency  Reviewed lab results with patient  Reduce Vit D supplement from daily to M/W/F    Update labs in 6 months, one week before scheduled OV.   BP (!) 118/50   Pulse 82   Temp 97.2 °F (36.2 °C)   Wt 58.1 kg (128 lb)   SpO2 97%   BMI 23.41 kg/m²     Current Outpatient Medications   Medication Sig Dispense Refill    lisinopril (PRINIVIL;ZESTRIL) 10 MG tablet TAKE 1 TABLET BY MOUTH EVERY DAY 90 tablet 3    tretinoin (RETIN-A) 0.025 % cream APPLY THIN LAYER TO ACNE PRONE AREA AT NIGHT      Pyridoxine HCl (VITAMIN B6) 100 MG TABS       Coenzyme Q10 (COQ10) 200 MG CAPS Take 1 capsule by mouth daily      Red Yeast Rice Extract (RED YEAST RICE PO) Take by mouth      progesterone (PROMETRIUM) 100 MG CAPS capsule Take by mouth daily      ACZONE 7.5 % GEL MABLE EXT TO FACE QD      Cyanocobalamin (VITAMIN B-12) 1000 MCG CR tablet Take 1 tablet by mouth daily      Cholecalciferol (VITAMIN D3) 2000 UNITS CAPS Take 1 capsule by mouth daily      calcium carbonate (OSCAL) 500 MG TABS tablet Take 1 tablet by mouth daily      Lactobacillus

## 2025-07-28 NOTE — TELEPHONE ENCOUNTER
Refill request for TRAZODONE medication.     Name of Pharmacy- [facility]      Last visit - 3-11-25     Pending visit - 10-9-25    Last refill -6-27-25      Medication Contract signed -   Last Oarrs ran-         Additional Comments

## 2025-07-29 RX ORDER — TRAZODONE HYDROCHLORIDE 50 MG/1
50 TABLET ORAL NIGHTLY PRN
Qty: 30 TABLET | Refills: 3 | Status: SHIPPED | OUTPATIENT
Start: 2025-07-29

## (undated) DEVICE — GLOVE,SURG,SENSICARE,ALOE,LF,PF,7: Brand: MEDLINE

## (undated) DEVICE — SET ADMIN PRIMING 7ML L30IN 7.35LB 20 GTT 2ND RLER CLMP

## (undated) DEVICE — TOWEL,OR,DSP,ST,BLUE,STD,4/PK,20PK/CS: Brand: MEDLINE

## (undated) DEVICE — GLOVE SURG SZ 65 L12IN FNGR THK79MIL GRN LTX FREE

## (undated) DEVICE — STERILE POLYISOPRENE POWDER-FREE SURGICAL GLOVES: Brand: PROTEXIS

## (undated) DEVICE — SMARTGOWN BREATHABLE SURGICAL GOWN: Brand: CONVERTORS

## (undated) DEVICE — DRAPE,U/ SHT,SPLIT,PLAS,STERIL: Brand: MEDLINE

## (undated) DEVICE — MEDI-VAC NON-CONDUCTIVE SUCTION TUBING: Brand: CARDINAL HEALTH

## (undated) DEVICE — 90403 SHOULDER ARTHROSCOPY KIT: Brand: 90403 SHOULDER ARTHROSCOPY KIT

## (undated) DEVICE — CANNULA THREADED FLEX 8.0 X 72MM: Brand: CLEAR-TRAC

## (undated) DEVICE — 4.5 MM FULL RADIUS STRAIGHT                                    BLADES, POWER/EP-1, YELLOW, PACKAGED                                    6 PER BOX, STERILE: Brand: DYONICS

## (undated) DEVICE — AMBIENT SUPER TURBOVAC 90: Brand: COBLATION

## (undated) DEVICE — INTENDED FOR TISSUE SEPARATION, AND OTHER PROCEDURES THAT REQUIRE A SHARP SURGICAL BLADE TO PUNCTURE OR CUT.: Brand: BARD-PARKER ® STAINLESS STEEL BLADES

## (undated) DEVICE — TUBE IRRIG L8IN LNG PT W/ CONN FOR PMP SYS REDEUCE

## (undated) DEVICE — GAUZE,SPONGE,4"X4",16PLY,STRL,LF,10/TRAY: Brand: MEDLINE

## (undated) DEVICE — TOWEL,OR,DSP,ST,BLUE,STD,8/PK,10PK/CS: Brand: MEDLINE

## (undated) DEVICE — SYRINGE BLB 50CC IRRIG PLIABLE FNGR FLNG GRAD FLSK DISP

## (undated) DEVICE — TUBING PMP L8FT LNG W/ CONN FOR AR-6400 REDEUCE

## (undated) DEVICE — LIGHT HANDLE: Brand: DEVON

## (undated) DEVICE — 3M™ STERI-DRAPE™ U-DRAPE, LONG 1019: Brand: STERI-DRAPE™

## (undated) DEVICE — DYONICS 4.0 MM ELITE                                    ACROMIOBLASTER STRAIGHT DISPOSABLE                                    BURRS, SAGE GREEN, 10000 MAXIMUM                                    RPM, PACKAGED 6 PER BOX, STERILE

## (undated) DEVICE — GOWN,SIRUS,NON REINFRCD,LARGE,SET IN SL: Brand: MEDLINE

## (undated) DEVICE — ELECTRODE PT RET AD L9FT HI MOIST COND ADH HYDRGEL CORDED

## (undated) DEVICE — 3M™ MEDIPORE™ SOFT CLOTH TAPE, 4 INCH X 10 YARDS, 12 ROLLS/CASE, 2964: Brand: 3M™ MEDIPORE™

## (undated) DEVICE — PAD,ABDOMINAL,8"X10",ST,LF: Brand: MEDLINE

## (undated) DEVICE — SUTURE MCRYL SZ 4-0 L18IN ABSRB UD L19MM PS-2 3/8 CIR PRIM Y496G

## (undated) DEVICE — STANDARD HYPODERMIC NEEDLE,POLYPROPYLENE HUB: Brand: MONOJECT

## (undated) DEVICE — 3M™ STERI-STRIP™ REINFORCED ADHESIVE SKIN CLOSURES, R1547, 1/2 IN X 4 IN (12 MM X 100 MM), 6 STRIPS/ENVELOPE: Brand: 3M™ STERI-STRIP™

## (undated) DEVICE — SET GRAV VENT NVENT CK VLV 3 NDL FREE PRT 10 GTT

## (undated) DEVICE — 1810 FOAM BLOCK NEEDLE COUNTER: Brand: DEVON

## (undated) DEVICE — PACK PROCEDURE SURG ARTHSCP CUST

## (undated) DEVICE — FIRSTPASS ST SUTURE PASSER, SELF CAPTURE: Brand: FIRSTPASS

## (undated) DEVICE — SKIN MARKER,REGULAR TIP WITH RULER AND LABELS: Brand: DEVON

## (undated) DEVICE — 3M™ TEGADERM™ TRANSPARENT FILM DRESSING FRAME STYLE, 1624W, 2-3/8 IN X 2-3/4 IN (6 CM X 7 CM), 100/CT 4CT/CASE: Brand: 3M™ TEGADERM™

## (undated) DEVICE — SOLUTION IV IRRIG 500ML 0.9% SODIUM CHL 2F7123

## (undated) DEVICE — SOLUTION IRRIG 5L LAC R BG

## (undated) DEVICE — GLOVE SURG SZ 65 L12IN FNGR THK94MIL STD WHT LTX FREE

## (undated) DEVICE — CATHETER IV 20GA L1.25IN PNK FEP SFTY STR HUB RADPQ DISP

## (undated) DEVICE — SOLUTION IV 1000ML LAC RINGERS PH 6.5 INJ USP VIAFLX PLAS

## (undated) DEVICE — FLUID CONTROL SHOULDER DRAPE PACK: Brand: CONVERTORS

## (undated) DEVICE — SYRINGE MED 10ML LUERLOCK TIP W/O SFTY DISP

## (undated) DEVICE — PENCIL ES L3M BTTN SWCH S STL HEX LOK BLDE ELECTRD HOLSTER

## (undated) DEVICE — SUTURE VCRL SZ 2-0 L18IN ABSRB UD CT-1 L36MM 1/2 CIR J839D